# Patient Record
Sex: MALE | Race: OTHER | HISPANIC OR LATINO | URBAN - METROPOLITAN AREA
[De-identification: names, ages, dates, MRNs, and addresses within clinical notes are randomized per-mention and may not be internally consistent; named-entity substitution may affect disease eponyms.]

---

## 2017-01-18 ENCOUNTER — EMERGENCY (EMERGENCY)
Facility: HOSPITAL | Age: 57
LOS: 1 days | Discharge: HOME | End: 2017-01-18
Attending: EMERGENCY MEDICINE | Admitting: EMERGENCY MEDICINE
Payer: MEDICAID

## 2017-01-18 VITALS
TEMPERATURE: 98 F | RESPIRATION RATE: 18 BRPM | HEART RATE: 62 BPM | DIASTOLIC BLOOD PRESSURE: 82 MMHG | SYSTOLIC BLOOD PRESSURE: 129 MMHG | OXYGEN SATURATION: 96 %

## 2017-01-18 DIAGNOSIS — H92.02 OTALGIA, LEFT EAR: ICD-10-CM

## 2017-01-18 PROCEDURE — 99283 EMERGENCY DEPT VISIT LOW MDM: CPT

## 2017-01-18 RX ORDER — CIPROFLOXACIN AND DEXAMETHASONE 3; 1 MG/ML; MG/ML
3 SUSPENSION/ DROPS AURICULAR (OTIC)
Qty: 1 | Refills: 0 | OUTPATIENT
Start: 2017-01-18 | End: 2017-01-28

## 2017-01-18 RX ORDER — AMOXICILLIN 250 MG/5ML
1 SUSPENSION, RECONSTITUTED, ORAL (ML) ORAL
Qty: 20 | Refills: 0 | OUTPATIENT
Start: 2017-01-18 | End: 2017-01-28

## 2017-01-18 NOTE — ED PROVIDER NOTE - PLAN OF CARE
- Hay 2 antibioticos en tu farmacia  - Yaritza 1 pastilla 2 veces al zack por 10 lay  - [place] 3 gotas 2 veces al zack por 10 lay  - Puede hector Motrin 400-600mg cada 6 horas por dolor. Yaritza con comida.

## 2017-01-18 NOTE — ED PROVIDER NOTE - CARE PLAN
Principal Discharge DX:	Ear pain, left  Instructions for follow-up, activity and diet:	- Hay 2 antibioticos en tu farmacia  - Yaritza 1 pastilla 2 veces al zack por 10 lay  - Usa 3 gotas 2 veces al zack por 10 lay  - Puede hector Motrin 400-600mg cada 6 horas por dolor. Yaritza con comida.

## 2017-01-18 NOTE — ED PROVIDER NOTE - MEDICAL DECISION MAKING DETAILS
Left ear pain, back pain.  Improved after treatment for otitis externa, but likely also has otitis media.  No neuro defecits, fever/chills.  Otherwise well appearing.  will send new antibiotics to pharmacy and discharge home.    - Veronica Garrido DO Left ear pain, back pain.  Improved after treatment for otitis externa, but likely also has otitis media.  No neuro defecits, fever/chills.  Otherwise well appearing.  will send new antibiotics to pharmacy and discharge home.    - Veronica Garrido,   Attending Statement: Agree with the above.  Otitis externa c evidence of otitis media.  No evidence of mastoiditis, meningitis, or CSVT.  Oral abx.  D/C.  --JULIAN

## 2017-01-18 NOTE — ED PROVIDER NOTE - PHYSICAL EXAMINATION
Gen: NAD, AOx3  Head: NCAT  HEENT: PERRL, oral mucosa moist, no oropharyngeal exudates, normal conjunctiva.  Left external ear erythematous and edematous, no drainage.  Mild tenderness to palpation.  No pain with movement of pinna.  Left TM partially visible due to cerumen, but purulence noted on TM.      Lung: CTAB, no respiratory distress  CV: rrr, no murmurs, Normal perfusion  Neuro: No focal neurologic deficits   Psych: normal affect  - Veronica Garrido DO

## 2017-01-18 NOTE — ED PROVIDER NOTE - OBJECTIVE STATEMENT
53 y/o Irish speaking male with no pmh presents with ear pain.  Pt was seen on 12/25 for purulent and bloody drainage from left ear after popping a pimple 2 dys prior.  Pt was treated with cipro and polymyxin/neomycin drops for 10-14 days.  He no longer has drainage from the ear but it is still red and tender to palpation and he has new onset of diffuse, upper back pain.  No hearing loss, no fever/chills, no URI symptoms, no sick contacts.    - Veronica Garrido DO

## 2017-01-29 ENCOUNTER — EMERGENCY (EMERGENCY)
Facility: HOSPITAL | Age: 57
LOS: 1 days | Discharge: HOME | End: 2017-01-29
Attending: EMERGENCY MEDICINE | Admitting: EMERGENCY MEDICINE
Payer: SELF-PAY

## 2017-01-29 VITALS
HEIGHT: 60 IN | RESPIRATION RATE: 16 BRPM | OXYGEN SATURATION: 95 % | SYSTOLIC BLOOD PRESSURE: 122 MMHG | HEART RATE: 63 BPM | TEMPERATURE: 98 F | DIASTOLIC BLOOD PRESSURE: 79 MMHG

## 2017-01-29 DIAGNOSIS — H92.02 OTALGIA, LEFT EAR: ICD-10-CM

## 2017-01-29 DIAGNOSIS — H60.62 UNSPECIFIED CHRONIC OTITIS EXTERNA, LEFT EAR: ICD-10-CM

## 2017-01-29 DIAGNOSIS — Z88.6 ALLERGY STATUS TO ANALGESIC AGENT: ICD-10-CM

## 2017-01-29 PROCEDURE — 99284 EMERGENCY DEPT VISIT MOD MDM: CPT

## 2017-01-29 PROCEDURE — 99283 EMERGENCY DEPT VISIT LOW MDM: CPT

## 2017-01-29 RX ORDER — CIPROFLOXACIN LACTATE 400MG/40ML
1 VIAL (ML) INTRAVENOUS
Qty: 14 | Refills: 0 | OUTPATIENT
Start: 2017-01-29 | End: 2017-02-05

## 2017-01-29 NOTE — ED ADULT NURSE NOTE - OBJECTIVE STATEMENT
51 y/o  male speaks some English, c/o through , that has had months of lt ear pain treated for otitis externa on 3 occasions with different antibiotic regimes with unsuccessful healing. Lt ear red draining some fluid.

## 2017-01-29 NOTE — ED PROVIDER NOTE - ATTENDING CONTRIBUTION TO CARE
I saw pt 1 month ago for ext canal abscess that self-drained.  I put him on doxy and oflox drops.  he has been to the ER 2 more times, given cipro and amoxicillin.  he still has left ear pain and redness.    no fever, well appearing, mild canal erythema and ext ear redness.  no lymphadenopathy.  given this is his 4th ER visit for this, ENT consulted.  recommended po abx and topical abx.  cont ciprodex, start po cipro.  stressed importance of f/u at ENT clinic.  no signs of mastoiditis.

## 2017-01-29 NOTE — ED PROVIDER NOTE - PLAN OF CARE
1. return for worsening symptoms or anything concerning to you  2. take all home meds as prescribed  3. follow up with your pmd call to make an appointment  4. use ciprodex as directed  5. take cipro 500mg PO BID x 7 days as directed  6. follow up with the ENT clinic this week call  (at [facility])

## 2017-01-29 NOTE — ED PROVIDER NOTE - OBJECTIVE STATEMENT
52M no pmh presents with left ear pain. Pain started > 1 month ago. Seen at that time diagnosed with in internal auditory canal abscess given ofloxacin drops and oral doxycyline. pt completed a 10 day course but symptoms didn't get better. came back to ED received a course of amoxicillin for 10 days. Symptoms didn't get better. came back after another 10 day course received ciprodex and oral cipro and completed another 10 day course. Symptoms still present. no pain over mastoid. no fevers. no change in hearing. pain moderate constant non-radiating.

## 2017-01-29 NOTE — ED PROVIDER NOTE - MEDICAL DECISION MAKING DETAILS
52M presents with left ear pain redness and fibrous material. no hx of dm. no mastoid pain. symptoms consistent with recurrent OE. Will pain symptom control and have ENT consult.

## 2017-01-29 NOTE — ED PROVIDER NOTE - CARE PLAN
Principal Discharge DX:	Chronic otitis externa of left ear  Instructions for follow-up, activity and diet:	1. return for worsening symptoms or anything concerning to you  2. take all home meds as prescribed  3. follow up with your pmd call to make an appointment  4. use ciprodex as directed  5. take cipro 500mg PO BID x 7 days as directed  6. follow up with the ENT clinic this week call  (at [facility]) Principal Discharge DX:	Chronic otitis externa of left ear  Instructions for follow-up, activity and diet:	1. return for worsening symptoms or anything concerning to you  2. take all home meds as prescribed  3. follow up with your pmd call to make an appointment  4. use ciprodex as directed  5. take cipro 500mg PO BID x 7 days as directed  6. follow up with the ENT clinic this week call  (at [facility])

## 2017-01-29 NOTE — ED PROVIDER NOTE - PROGRESS NOTE DETAILS
ENT saw patient - said likely chronic OE - pt may ne myringotomy tube-  recommends another course of cipro and ciprodex (pt already has) and to follow up with ENT this week .

## 2017-02-16 ENCOUNTER — EMERGENCY (EMERGENCY)
Facility: HOSPITAL | Age: 57
LOS: 1 days | Discharge: HOME | End: 2017-02-16
Attending: EMERGENCY MEDICINE | Admitting: EMERGENCY MEDICINE
Payer: MEDICAID

## 2017-02-16 VITALS
SYSTOLIC BLOOD PRESSURE: 156 MMHG | HEART RATE: 61 BPM | OXYGEN SATURATION: 95 % | TEMPERATURE: 97 F | DIASTOLIC BLOOD PRESSURE: 88 MMHG | RESPIRATION RATE: 16 BRPM

## 2017-02-16 DIAGNOSIS — H92.02 OTALGIA, LEFT EAR: ICD-10-CM

## 2017-02-16 PROCEDURE — 99284 EMERGENCY DEPT VISIT MOD MDM: CPT

## 2017-02-16 RX ORDER — CIPROFLOXACIN LACTATE 400MG/40ML
1 VIAL (ML) INTRAVENOUS
Qty: 20 | Refills: 0 | OUTPATIENT
Start: 2017-02-16 | End: 2017-02-26

## 2017-02-16 RX ORDER — ACETAMINOPHEN 500 MG
650 TABLET ORAL ONCE
Qty: 0 | Refills: 0 | Status: COMPLETED | OUTPATIENT
Start: 2017-02-16 | End: 2017-02-16

## 2017-02-16 RX ADMIN — Medication 650 MILLIGRAM(S): at 21:08

## 2017-02-16 NOTE — ED PROVIDER NOTE - PLAN OF CARE
Take tylenol 650mg every 8 hours or motrin 600mg every 8 hours for pain as needed. Take with food. Take cipro 500mg every 12 hours for 10 days.   Follow up with ENT clinic #[phone] or Dr. Rivas [phone] in office next week. Call to make appointment.   Return to ER for worsening pain, fever, drainage from ear, or any other concerning symptoms. Apply warm compresses to left ear for comfort as needed.  Take tylenol 650mg every 8 hours or motrin 600mg every 8 hours for pain as needed. Take with food. Take cipro 500mg every 12 hours for 10 days.   Follow up with ENT clinic #[phone] or Dr. Rivas [phone] in office next week. Call to make appointment.   Return to ER for worsening pain, fever, drainage from ear, or any other concerning symptoms.

## 2017-02-16 NOTE — ED PROVIDER NOTE - OBJECTIVE STATEMENT
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  Pt using ciprodex drops with no relief, reports pain is 9/10, non-radiating. Associated fever. Denies HA, dizziness, ear drainage.

## 2017-02-16 NOTE — ED PROVIDER NOTE - MEDICAL DECISION MAKING DETAILS
52M PMH HLD presenting with L ear pain x 1.5 months. associated fever. On exam, +erythema and inflammation of EAC, + tragal ttp. ENT consult, analgesia, re-eval

## 2017-02-16 NOTE — ED ADULT NURSE NOTE - OBJECTIVE STATEMENT
52 year old male a/ox3 ambulatory presenting to ed with ear pain. patient evaluated tx and dc by md and pa. ambulated out of ed

## 2017-02-16 NOTE — ED PROVIDER NOTE - CARE PLAN
Instructions for follow-up, activity and diet:	Take tylenol 650mg every 8 hours or motrin 600mg every 8 hours for pain as needed. Take with food. Take cipro 500mg every 12 hours for 10 days.   Follow up with ENT clinic #[phone] or Dr. Rivas [phone] in office next week. Call to make appointment.   Return to ER for worsening pain, fever, drainage from ear, or any other concerning symptoms. Instructions for follow-up, activity and diet:	Apply warm compresses to left ear for comfort as needed.  Take tylenol 650mg every 8 hours or motrin 600mg every 8 hours for pain as needed. Take with food. Take cipro 500mg every 12 hours for 10 days.   Follow up with ENT clinic #[phone] or Dr. Rivas [phone] in office next week. Call to make appointment.   Return to ER for worsening pain, fever, drainage from ear, or any other concerning symptoms. Principal Discharge DX:	Perichondritis  Instructions for follow-up, activity and diet:	Apply warm compresses to left ear for comfort as needed.  Take tylenol 650mg every 8 hours or motrin 600mg every 8 hours for pain as needed. Take with food. Take cipro 500mg every 12 hours for 10 days.   Follow up with ENT clinic #[phone] or Dr. Rivas [phone] in office next week. Call to make appointment.   Return to ER for worsening pain, fever, drainage from ear, or any other concerning symptoms. Principal Discharge DX:	Perichondritis  Instructions for follow-up, activity and diet:	Apply warm compresses to left ear for comfort as needed.  Take tylenol 650mg every 8 hours or motrin 600mg every 8 hours for pain as needed. Take with food. Take cipro 500mg every 12 hours for 10 days.   Follow up with ENT clinic #[phone] or Dr. Rivas [phone] in office next week. Call to make appointment.   Return to ER for worsening pain, fever, drainage from ear, or any other concerning symptoms.

## 2017-02-27 ENCOUNTER — APPOINTMENT (OUTPATIENT)
Dept: OTOLARYNGOLOGY | Facility: CLINIC | Age: 57
End: 2017-02-27

## 2017-02-27 VITALS — WEIGHT: 170 LBS

## 2017-02-27 DIAGNOSIS — Z00.00 ENCOUNTER FOR GENERAL ADULT MEDICAL EXAMINATION W/OUT ABNORMAL FINDINGS: ICD-10-CM

## 2017-02-27 DIAGNOSIS — E78.00 PURE HYPERCHOLESTEROLEMIA, UNSPECIFIED: ICD-10-CM

## 2017-02-27 RX ORDER — SULFACETAMIDE SODIUM AND PREDNISOLONE SODIUM PHOSPHATE 100; 2.3 MG/ML; MG/ML
10-0.23 SOLUTION/ DROPS OPHTHALMIC
Qty: 1 | Refills: 2 | Status: COMPLETED | COMMUNITY
Start: 2017-02-27

## 2017-03-01 PROBLEM — E78.00 HIGH CHOLESTEROL: Status: RESOLVED | Noted: 2017-02-27 | Resolved: 2017-03-01

## 2017-03-01 RX ORDER — CIPROFLOXACIN HYDROCHLORIDE 500 MG/1
500 TABLET, FILM COATED ORAL
Refills: 0 | Status: COMPLETED | COMMUNITY

## 2017-03-01 RX ORDER — AMOXICILLIN 875 MG/1
875 TABLET, FILM COATED ORAL
Qty: 20 | Refills: 0 | Status: DISCONTINUED | COMMUNITY
Start: 2017-01-18

## 2017-03-01 RX ORDER — CIPROFLOXACIN HYDROCHLORIDE 750 MG/1
750 TABLET, FILM COATED ORAL
Qty: 20 | Refills: 0 | Status: COMPLETED | COMMUNITY
Start: 2017-01-01

## 2017-03-09 ENCOUNTER — OUTPATIENT (OUTPATIENT)
Dept: OUTPATIENT SERVICES | Facility: HOSPITAL | Age: 57
LOS: 1 days | End: 2017-03-09
Payer: SELF-PAY

## 2017-03-09 ENCOUNTER — RESULT CHARGE (OUTPATIENT)
Age: 57
End: 2017-03-09

## 2017-03-09 ENCOUNTER — APPOINTMENT (OUTPATIENT)
Dept: INTERNAL MEDICINE | Facility: CLINIC | Age: 57
End: 2017-03-09

## 2017-03-09 VITALS — HEART RATE: 69 BPM | WEIGHT: 169 LBS | DIASTOLIC BLOOD PRESSURE: 76 MMHG | SYSTOLIC BLOOD PRESSURE: 132 MMHG

## 2017-03-09 DIAGNOSIS — I10 ESSENTIAL (PRIMARY) HYPERTENSION: ICD-10-CM

## 2017-03-09 PROBLEM — Z00.00 ENCOUNTER FOR PREVENTIVE HEALTH EXAMINATION: Noted: 2017-02-03

## 2017-03-09 LAB
GLUCOSE BLDC GLUCOMTR-MCNC: 91
HBA1C MFR BLD HPLC: 5.5

## 2017-03-09 RX ORDER — SULFACETAMIDE SODIUM AND PREDNISOLONE SODIUM PHOSPHATE 100; 2.3 MG/ML; MG/ML
10-0.23 SOLUTION/ DROPS OPHTHALMIC
Qty: 1 | Refills: 1 | Status: DISCONTINUED | COMMUNITY
Start: 2017-02-28 | End: 2017-03-09

## 2017-03-09 RX ORDER — TRIAMCINOLONE ACETONIDE 1 MG/G
0.1 CREAM TOPICAL TWICE DAILY
Qty: 1 | Refills: 0 | Status: DISCONTINUED | COMMUNITY
Start: 2017-02-27 | End: 2017-03-09

## 2017-03-09 RX ORDER — CIPROFLOXACIN AND DEXAMETHASONE 3; 1 MG/ML; MG/ML
0.3-0.1 SUSPENSION/ DROPS AURICULAR (OTIC)
Qty: 8 | Refills: 0 | Status: DISCONTINUED | COMMUNITY
Start: 2017-01-18 | End: 2017-03-09

## 2017-03-10 DIAGNOSIS — H66.90 OTITIS MEDIA, UNSPECIFIED, UNSPECIFIED EAR: ICD-10-CM

## 2017-04-12 ENCOUNTER — APPOINTMENT (OUTPATIENT)
Dept: INTERNAL MEDICINE | Facility: CLINIC | Age: 57
End: 2017-04-12

## 2017-04-12 VITALS
BODY MASS INDEX: 27.64 KG/M2 | HEART RATE: 59 BPM | OXYGEN SATURATION: 98 % | HEIGHT: 66.5 IN | WEIGHT: 174 LBS | SYSTOLIC BLOOD PRESSURE: 110 MMHG | DIASTOLIC BLOOD PRESSURE: 80 MMHG

## 2017-04-12 RX ORDER — FLUCONAZOLE 150 MG/1
150 TABLET ORAL DAILY
Qty: 3 | Refills: 0 | Status: COMPLETED | COMMUNITY
Start: 2017-03-09 | End: 2017-04-12

## 2017-04-12 RX ORDER — CIPROFLOXACIN 0.5 MG/.25ML
0.2 SOLUTION/ DROPS AURICULAR (OTIC) TWICE DAILY
Qty: 1 | Refills: 0 | Status: COMPLETED | COMMUNITY
Start: 2017-03-09 | End: 2017-04-12

## 2017-04-13 ENCOUNTER — OUTPATIENT (OUTPATIENT)
Dept: OUTPATIENT SERVICES | Facility: HOSPITAL | Age: 57
LOS: 1 days | End: 2017-04-13
Payer: SELF-PAY

## 2017-04-13 DIAGNOSIS — I10 ESSENTIAL (PRIMARY) HYPERTENSION: ICD-10-CM

## 2017-04-13 DIAGNOSIS — H66.90 OTITIS MEDIA, UNSPECIFIED, UNSPECIFIED EAR: ICD-10-CM

## 2017-04-19 DIAGNOSIS — H60.542: ICD-10-CM

## 2017-05-03 NOTE — ED ADULT NURSE NOTE - PSH
No significant past surgical history gait, locomotion, and balance/aerobic capacity/endurance/muscle strength

## 2017-05-10 ENCOUNTER — APPOINTMENT (OUTPATIENT)
Dept: INTERNAL MEDICINE | Facility: CLINIC | Age: 57
End: 2017-05-10

## 2017-05-10 VITALS
SYSTOLIC BLOOD PRESSURE: 110 MMHG | BODY MASS INDEX: 27.66 KG/M2 | WEIGHT: 174 LBS | RESPIRATION RATE: 14 BRPM | HEART RATE: 58 BPM | TEMPERATURE: 98.2 F | DIASTOLIC BLOOD PRESSURE: 80 MMHG

## 2017-05-10 RX ORDER — AMOXICILLIN AND CLAVULANATE POTASSIUM 875; 125 MG/1; MG/1
875-125 TABLET, COATED ORAL
Qty: 20 | Refills: 0 | Status: COMPLETED | COMMUNITY
Start: 2017-03-27

## 2017-06-06 ENCOUNTER — EMERGENCY (EMERGENCY)
Facility: HOSPITAL | Age: 57
LOS: 1 days | Discharge: HOME | End: 2017-06-06
Attending: EMERGENCY MEDICINE | Admitting: EMERGENCY MEDICINE
Payer: MEDICAID

## 2017-06-06 VITALS
OXYGEN SATURATION: 97 % | SYSTOLIC BLOOD PRESSURE: 120 MMHG | RESPIRATION RATE: 18 BRPM | HEART RATE: 74 BPM | DIASTOLIC BLOOD PRESSURE: 76 MMHG

## 2017-06-06 VITALS
DIASTOLIC BLOOD PRESSURE: 82 MMHG | RESPIRATION RATE: 18 BRPM | TEMPERATURE: 98 F | HEART RATE: 69 BPM | OXYGEN SATURATION: 96 % | SYSTOLIC BLOOD PRESSURE: 116 MMHG

## 2017-06-06 DIAGNOSIS — Z88.6 ALLERGY STATUS TO ANALGESIC AGENT: ICD-10-CM

## 2017-06-06 DIAGNOSIS — K64.4 RESIDUAL HEMORRHOIDAL SKIN TAGS: ICD-10-CM

## 2017-06-06 DIAGNOSIS — K62.89 OTHER SPECIFIED DISEASES OF ANUS AND RECTUM: ICD-10-CM

## 2017-06-06 DIAGNOSIS — E78.5 HYPERLIPIDEMIA, UNSPECIFIED: ICD-10-CM

## 2017-06-06 PROCEDURE — 99283 EMERGENCY DEPT VISIT LOW MDM: CPT

## 2017-06-06 RX ORDER — HYDROCORTISONE 1 %
1 OINTMENT (GRAM) TOPICAL
Qty: 14 | Refills: 0 | OUTPATIENT
Start: 2017-06-06 | End: 2017-06-20

## 2017-06-06 RX ORDER — SENNA PLUS 8.6 MG/1
1 TABLET ORAL
Qty: 14 | Refills: 0 | OUTPATIENT
Start: 2017-06-06

## 2017-06-06 RX ORDER — DOCUSATE SODIUM 100 MG
1 CAPSULE ORAL
Qty: 30 | Refills: 0 | OUTPATIENT
Start: 2017-06-06

## 2017-06-06 NOTE — ED PROVIDER NOTE - ATTENDING CONTRIBUTION TO CARE
I have examined and evaluated this patient with the above resident or PA, and agree with the documented clinical history, exam and plan.   Briefly: 53M c/o rectal pain and lump; enlarged hemorrhoid noted, not thrombosed; will dc on miralax, instructions to use ring shaped cushion, f/u with pmd.

## 2017-06-06 NOTE — ED PROVIDER NOTE - OBJECTIVE STATEMENT
52yo M with h/o HLD, c/o rectal pain and lump.  Patient states he has been having intermittent rectal pain a/w defecation for past 2 days, today pain became more constant and reports today having noticed a lump or "grain" located protruding from his anus.  Recently had some constipation a/w antibiotics used for ear infection (few months ago), but constipation is now resolved; states he has not had to strain or push hard to have bowel movement.  No bleeding.  No history of hemorrhoids or rectal/anal surgery.

## 2017-06-06 NOTE — ED ADULT NURSE NOTE - OBJECTIVE STATEMENT
54 y/o M presents to the ED c/o rectal pain.  Patient has PMH HLD.  Patient is Kazakh speaking,  phone used.  Patient states he has been having intermittent rectal pain  when trying to have a BM for the last 2-3 days.  Pt states that today pain became more constant and reports today having noticed a lump or "grain" located protruding from his anus.  Pt also mentions that he recently was on antibiotics for an ear infection and states the antx. made him constipated, but that has now resolved.  Pt states he has not had to strain or push hard to have bowel movement.  No bleeding when making a BM or wiping.  Pt denies any hx of hemorrhoids.  Patient is A&Ox4. Face is symmetrical. PERRL 3mmB. Speech is clear.  Pt safety and comfort measures provided.

## 2017-06-06 NOTE — ED PROVIDER NOTE - MEDICAL DECISION MAKING DETAILS
53M c/o rectal pain and lump; enlarged hemorrhoid noted, not thrombosed; will dc on miralax, instructions to use ring shaped cushion, f/u with pmd.

## 2017-06-21 ENCOUNTER — OUTPATIENT (OUTPATIENT)
Dept: OUTPATIENT SERVICES | Facility: HOSPITAL | Age: 57
LOS: 1 days | End: 2017-06-21
Payer: SELF-PAY

## 2017-06-21 ENCOUNTER — APPOINTMENT (OUTPATIENT)
Dept: INTERNAL MEDICINE | Facility: CLINIC | Age: 57
End: 2017-06-21

## 2017-06-21 VITALS
BODY MASS INDEX: 26.68 KG/M2 | SYSTOLIC BLOOD PRESSURE: 106 MMHG | DIASTOLIC BLOOD PRESSURE: 80 MMHG | HEIGHT: 66 IN | WEIGHT: 166 LBS

## 2017-06-21 DIAGNOSIS — I10 ESSENTIAL (PRIMARY) HYPERTENSION: ICD-10-CM

## 2017-06-22 RX ORDER — CIPROFLOXACIN AND DEXAMETHASONE 3; 1 MG/ML; MG/ML
0.3-0.1 SUSPENSION/ DROPS AURICULAR (OTIC)
Qty: 1 | Refills: 0 | Status: DISCONTINUED | COMMUNITY
Start: 2017-05-10 | End: 2017-06-22

## 2017-06-23 DIAGNOSIS — M54.9 DORSALGIA, UNSPECIFIED: ICD-10-CM

## 2017-06-23 DIAGNOSIS — K62.89 OTHER SPECIFIED DISEASES OF ANUS AND RECTUM: ICD-10-CM

## 2017-06-23 DIAGNOSIS — H66.90 OTITIS MEDIA, UNSPECIFIED, UNSPECIFIED EAR: ICD-10-CM

## 2017-07-10 ENCOUNTER — OUTPATIENT (OUTPATIENT)
Dept: OUTPATIENT SERVICES | Facility: HOSPITAL | Age: 57
LOS: 1 days | Discharge: HOME | End: 2017-07-10

## 2017-07-10 ENCOUNTER — APPOINTMENT (OUTPATIENT)
Dept: OTOLARYNGOLOGY | Facility: CLINIC | Age: 57
End: 2017-07-10

## 2017-07-10 DIAGNOSIS — H60.542 ACUTE ECZEMATOID OTITIS EXTERNA, LEFT EAR: ICD-10-CM

## 2017-07-14 DIAGNOSIS — H60.542: ICD-10-CM

## 2017-12-04 ENCOUNTER — EMERGENCY (EMERGENCY)
Facility: HOSPITAL | Age: 57
LOS: 1 days | Discharge: HOME | End: 2017-12-04
Attending: EMERGENCY MEDICINE | Admitting: EMERGENCY MEDICINE
Payer: MEDICAID

## 2017-12-04 VITALS
DIASTOLIC BLOOD PRESSURE: 86 MMHG | HEART RATE: 72 BPM | OXYGEN SATURATION: 97 % | RESPIRATION RATE: 18 BRPM | SYSTOLIC BLOOD PRESSURE: 124 MMHG | TEMPERATURE: 98 F

## 2017-12-04 PROCEDURE — 99283 EMERGENCY DEPT VISIT LOW MDM: CPT

## 2017-12-04 RX ORDER — IBUPROFEN 200 MG
600 TABLET ORAL ONCE
Qty: 0 | Refills: 0 | Status: COMPLETED | OUTPATIENT
Start: 2017-12-04 | End: 2017-12-04

## 2017-12-04 RX ADMIN — Medication 600 MILLIGRAM(S): at 19:33

## 2017-12-04 RX ADMIN — Medication 1 TABLET(S): at 19:32

## 2017-12-04 NOTE — ED ADULT NURSE NOTE - OBJECTIVE STATEMENT
53 y.o. Male presents to the ED c/o L ear pain x4 days. Pt is Nauruan speaking only with friend at bedside to translate. Pt reports using mometasone cream on L ear. Pt states feeling "something blocking" his hearing. Redness noted on L ear. Ear wax noted in L ear. Pt states similar episode happening last year and followed up with ENT MD. RAYMOND. Denies vision changes. Pt is in no current distress. Comfort and safety provided. Will continue to monitor.

## 2017-12-04 NOTE — ED ADULT NURSE NOTE - CHPI ED SYMPTOMS NEG
no vomiting/no blurred vision/no change in level of consciousness/no chills/no syncope/no loss of consciousness/no nausea/no fever/no numbness

## 2017-12-04 NOTE — ED PROVIDER NOTE - MEDICAL DECISION MAKING DETAILS
Preston: Patient with left ear pain. wax removed, and tm dull and + fluid behind tm. will start on oral abx, pain control, refer to outpatient f/u.

## 2017-12-04 NOTE — ED PROVIDER NOTE - CARE PLAN
Principal Discharge DX:	Other recurrent acute nonsuppurative otitis media of left ear  Instructions for follow-up, activity and diet:	Take motrin 600mg every 8 hours as needed for pain.   Stay well hydrated.   Take augmentin twice daily for seven days.   Follow up with your PCP and/or ENT clinic for further evaluation in 2-3 days.   Return to ER for worsening pain, fever, vomiting, inability to take antibiotic, or any other concerning symptoms.

## 2017-12-04 NOTE — ED PROVIDER NOTE - PLAN OF CARE
Take motrin 600mg every 8 hours as needed for pain.   Stay well hydrated.   Take augmentin twice daily for seven days.   Follow up with your PCP and/or ENT clinic for further evaluation in 2-3 days.   Return to ER for worsening pain, fever, vomiting, inability to take antibiotic, or any other concerning symptoms.

## 2017-12-04 NOTE — ED PROVIDER NOTE - OBJECTIVE STATEMENT
54yo M with PMH HLD presenting with L ear pain x 4 days. Reports associated sensation of fluid in ear and decreased hearing. Reports he has been applying steroid ointment to ear with no relief. Reports similar ear pain last year. Denies fever/chills, drainage from ear, R ear pain, n/v, recent travel, recent illness, recent swimming. Denies smoking, alcohol use, drug use.

## 2017-12-18 ENCOUNTER — EMERGENCY (EMERGENCY)
Facility: HOSPITAL | Age: 57
LOS: 1 days | Discharge: HOME | End: 2017-12-18
Attending: EMERGENCY MEDICINE
Payer: MEDICAID

## 2017-12-18 VITALS
SYSTOLIC BLOOD PRESSURE: 144 MMHG | TEMPERATURE: 99 F | RESPIRATION RATE: 16 BRPM | OXYGEN SATURATION: 96 % | WEIGHT: 160.06 LBS | HEART RATE: 69 BPM | DIASTOLIC BLOOD PRESSURE: 82 MMHG

## 2017-12-18 PROCEDURE — 99282 EMERGENCY DEPT VISIT SF MDM: CPT

## 2017-12-18 NOTE — ED PROVIDER NOTE - ATTENDING CONTRIBUTION TO CARE
Attending MD Yin:   I personally have seen and examined this patient.  Physician assistant note reviewed and agree on plan of care and except where noted.  See below for details.    53M with PMH including HLD presents to the ED for recheck of ear.  Reports was seen on 12/4 for ear pain, given Augmentin, completed the course and is now here for persistent itching to external L ear.  Patient reports that the pain for which he initially came to the ED resolved during Augmentin course and has not returned.  Reports that he has been applying a cream, not prescribed, to the external ear.  Reports he has itching to the external ear.  Reports he also notices pain at L ear with yawning.  Denies fevers, chills. Denies change in hearing.  Denies purulence or bleeding from ear. Denies change in vision, double vision, sudden loss of vision.  Denies headache, neck pain.  Denies epistaxis.  On exam,no conjunctival injection, no rash on face, no dried blood at nares, bilateral TMs clear, no evidence of otitis media or externa bilaterally, +L ear with thick cream/ointment on ear, removed and cleaned from under helix with cotton tip applicators as thick and brownish, minimal erythema, no fluctuance; A/P: 53M with resolved otitis media, instructed to discontinue use of topical cream, follow up with PMD.  Follow up instructions given, importance of follow up emphasized, return to ED parameters reviewed and patient verbalized understanding.  All questions answered, all concerns addressed.

## 2017-12-18 NOTE — ED PROVIDER NOTE - PLAN OF CARE
Stop use of cream to the external ear  Follow up with your Primary Care Physician within the next 2-3 days  Continue your current medication regimen  Return to the Emergency Room if you experience new or worsening symptoms

## 2017-12-18 NOTE — ED PROVIDER NOTE - CARE PLAN
Principal Discharge DX:	Ear pain, left  Instructions for follow-up, activity and diet:	Stop use of cream to the external ear  Follow up with your Primary Care Physician within the next 2-3 days  Continue your current medication regimen  Return to the Emergency Room if you experience new or worsening symptoms

## 2017-12-18 NOTE — ED PROVIDER NOTE - OBJECTIVE STATEMENT
53 year old male w HLD treated in ED on December 4th 2017 for ottitis media with Augmentin which he completed his course. He reports that he has been applying a cream to the left external ear and has itching inside of the ear that persists. He also notices some pain in the left ear when he yawns.

## 2018-01-20 ENCOUNTER — MOBILE ON CALL (OUTPATIENT)
Age: 58
End: 2018-01-20

## 2018-01-21 ENCOUNTER — MOBILE ON CALL (OUTPATIENT)
Age: 58
End: 2018-01-21

## 2018-01-22 ENCOUNTER — APPOINTMENT (OUTPATIENT)
Dept: INTERNAL MEDICINE | Facility: CLINIC | Age: 58
End: 2018-01-22

## 2018-01-22 ENCOUNTER — OUTPATIENT (OUTPATIENT)
Dept: OUTPATIENT SERVICES | Facility: HOSPITAL | Age: 58
LOS: 1 days | End: 2018-01-22
Payer: SELF-PAY

## 2018-01-22 VITALS
DIASTOLIC BLOOD PRESSURE: 80 MMHG | WEIGHT: 169 LBS | BODY MASS INDEX: 27.16 KG/M2 | HEIGHT: 66 IN | SYSTOLIC BLOOD PRESSURE: 120 MMHG

## 2018-01-22 DIAGNOSIS — L82.1 OTHER SEBORRHEIC KERATOSIS: ICD-10-CM

## 2018-01-22 DIAGNOSIS — I10 ESSENTIAL (PRIMARY) HYPERTENSION: ICD-10-CM

## 2018-01-22 DIAGNOSIS — M54.9 DORSALGIA, UNSPECIFIED: ICD-10-CM

## 2018-01-22 DIAGNOSIS — K62.89 OTHER SPECIFIED DISEASES OF ANUS AND RECTUM: ICD-10-CM

## 2018-01-22 PROCEDURE — G0463: CPT

## 2018-01-25 DIAGNOSIS — R09.82 POSTNASAL DRIP: ICD-10-CM

## 2018-01-25 DIAGNOSIS — L82.1 OTHER SEBORRHEIC KERATOSIS: ICD-10-CM

## 2018-02-12 ENCOUNTER — EMERGENCY (EMERGENCY)
Facility: HOSPITAL | Age: 58
LOS: 1 days | Discharge: HOME | End: 2018-02-12
Attending: EMERGENCY MEDICINE | Admitting: EMERGENCY MEDICINE
Payer: MEDICAID

## 2018-02-12 VITALS
SYSTOLIC BLOOD PRESSURE: 139 MMHG | HEART RATE: 94 BPM | TEMPERATURE: 99 F | DIASTOLIC BLOOD PRESSURE: 100 MMHG | OXYGEN SATURATION: 99 % | RESPIRATION RATE: 16 BRPM

## 2018-02-12 PROCEDURE — 99284 EMERGENCY DEPT VISIT MOD MDM: CPT

## 2018-02-12 NOTE — ED ADULT NURSE NOTE - OBJECTIVE STATEMENT
Patient presented to ED ambulatory c/o abdominal distention and mild abdominal pain x 3 months, symptoms gradually getting worse. + bs, last bm yesterday.

## 2018-02-13 VITALS
TEMPERATURE: 98 F | RESPIRATION RATE: 17 BRPM | HEART RATE: 66 BPM | SYSTOLIC BLOOD PRESSURE: 117 MMHG | DIASTOLIC BLOOD PRESSURE: 79 MMHG | OXYGEN SATURATION: 97 %

## 2018-02-13 LAB
ALBUMIN SERPL ELPH-MCNC: 4.2 G/DL — SIGNIFICANT CHANGE UP (ref 3.3–5)
ALP SERPL-CCNC: 70 U/L — SIGNIFICANT CHANGE UP (ref 40–120)
ALT FLD-CCNC: 24 U/L RC — SIGNIFICANT CHANGE UP (ref 10–45)
ANION GAP SERPL CALC-SCNC: 15 MMOL/L — SIGNIFICANT CHANGE UP (ref 5–17)
AST SERPL-CCNC: 19 U/L — SIGNIFICANT CHANGE UP (ref 10–40)
BASOPHILS # BLD AUTO: 0.1 K/UL — SIGNIFICANT CHANGE UP (ref 0–0.2)
BASOPHILS NFR BLD AUTO: 1 % — SIGNIFICANT CHANGE UP (ref 0–2)
BILIRUB SERPL-MCNC: 0.7 MG/DL — SIGNIFICANT CHANGE UP (ref 0.2–1.2)
BUN SERPL-MCNC: 18 MG/DL — SIGNIFICANT CHANGE UP (ref 7–23)
CALCIUM SERPL-MCNC: 9.3 MG/DL — SIGNIFICANT CHANGE UP (ref 8.4–10.5)
CHLORIDE SERPL-SCNC: 105 MMOL/L — SIGNIFICANT CHANGE UP (ref 96–108)
CO2 SERPL-SCNC: 22 MMOL/L — SIGNIFICANT CHANGE UP (ref 22–31)
CREAT SERPL-MCNC: 0.77 MG/DL — SIGNIFICANT CHANGE UP (ref 0.5–1.3)
EOSINOPHIL # BLD AUTO: 0.2 K/UL — SIGNIFICANT CHANGE UP (ref 0–0.5)
EOSINOPHIL NFR BLD AUTO: 2.4 % — SIGNIFICANT CHANGE UP (ref 0–6)
GAS PNL BLDV: SIGNIFICANT CHANGE UP
GLUCOSE SERPL-MCNC: 93 MG/DL — SIGNIFICANT CHANGE UP (ref 70–99)
HCT VFR BLD CALC: 45.6 % — SIGNIFICANT CHANGE UP (ref 39–50)
HGB BLD-MCNC: 15.4 G/DL — SIGNIFICANT CHANGE UP (ref 13–17)
LIDOCAIN IGE QN: 44 U/L — SIGNIFICANT CHANGE UP (ref 7–60)
LYMPHOCYTES # BLD AUTO: 2.5 K/UL — SIGNIFICANT CHANGE UP (ref 1–3.3)
LYMPHOCYTES # BLD AUTO: 39.9 % — SIGNIFICANT CHANGE UP (ref 13–44)
MCHC RBC-ENTMCNC: 29.6 PG — SIGNIFICANT CHANGE UP (ref 27–34)
MCHC RBC-ENTMCNC: 33.9 GM/DL — SIGNIFICANT CHANGE UP (ref 32–36)
MCV RBC AUTO: 87.2 FL — SIGNIFICANT CHANGE UP (ref 80–100)
MONOCYTES # BLD AUTO: 0.4 K/UL — SIGNIFICANT CHANGE UP (ref 0–0.9)
MONOCYTES NFR BLD AUTO: 7 % — SIGNIFICANT CHANGE UP (ref 2–14)
NEUTROPHILS # BLD AUTO: 3.2 K/UL — SIGNIFICANT CHANGE UP (ref 1.8–7.4)
NEUTROPHILS NFR BLD AUTO: 49.6 % — SIGNIFICANT CHANGE UP (ref 43–77)
PLATELET # BLD AUTO: 159 K/UL — SIGNIFICANT CHANGE UP (ref 150–400)
POTASSIUM SERPL-MCNC: 3.9 MMOL/L — SIGNIFICANT CHANGE UP (ref 3.5–5.3)
POTASSIUM SERPL-SCNC: 3.9 MMOL/L — SIGNIFICANT CHANGE UP (ref 3.5–5.3)
PROT SERPL-MCNC: 7.3 G/DL — SIGNIFICANT CHANGE UP (ref 6–8.3)
RBC # BLD: 5.22 M/UL — SIGNIFICANT CHANGE UP (ref 4.2–5.8)
RBC # FLD: 12 % — SIGNIFICANT CHANGE UP (ref 10.3–14.5)
SODIUM SERPL-SCNC: 142 MMOL/L — SIGNIFICANT CHANGE UP (ref 135–145)
WBC # BLD: 6.4 K/UL — SIGNIFICANT CHANGE UP (ref 3.8–10.5)
WBC # FLD AUTO: 6.4 K/UL — SIGNIFICANT CHANGE UP (ref 3.8–10.5)

## 2018-02-13 PROCEDURE — 74177 CT ABD & PELVIS W/CONTRAST: CPT | Mod: 26

## 2018-02-13 RX ORDER — FAMOTIDINE 10 MG/ML
1 INJECTION INTRAVENOUS
Qty: 28 | Refills: 0
Start: 2018-02-13 | End: 2018-02-26

## 2018-02-13 NOTE — ED PROVIDER NOTE - PROGRESS NOTE DETAILS
Attending MD Yin: Patient re-evaluated and no acute issues at  this time.  Lab and radiology tests reviewed with patient.  Diet modifications discussed with patient.  Patient stable for discharge. Follow up instructions given, importance of follow up emphasized, return to ED parameters reviewed and patient verbalized understanding.  All questions answered, all concerns addressed.

## 2018-02-13 NOTE — ED PROVIDER NOTE - CARE PLAN
Principal Discharge DX:	Gastritis, presence of bleeding unspecified, unspecified chronicity, unspecified gastritis type

## 2018-02-13 NOTE — ED PROVIDER NOTE - OBJECTIVE STATEMENT
Attending MD Yin: 57M with PMH HLD PSH L knee sx presents to the ED with abdominal pain and bloating for 3 months.  REports increased over the last 8 days.  Reports Tuesday developed associated dizziness, nausea, vomiting.  Denies fevers, chills.  Denies diarrhea, blood in stools.  Denies dysuria, hematuria, change in urinary habits including frequency, urgency. Reports abdominal pain is diffuse and not associated with po intake, movement, no alleviating factors.  Denies meds, reports allergy to Tylenol (rash), reports quit EtOH 18 yrs ago, quit tobacco 20 yrs ago, denies drugs.  Denies STI, sexual activity. On exam, head NCAT, PERRL, FROM at neck, no tenderness to palpation or stepoffs along length of spine, lungs CTAB with good inspiratory effort, +S1S2, no m/r/g, abdomen soft with +BS, +diffuse abdominal tenderness, ND, R CVAT, moving all extremities with 5/5 strength bilateral upper and lower extremities, good and equal  strength bilaterally; A/P: 57M with diffuse abdominal tenderness acute on chronic, Ddx gastritis, colitis, diverticulitis, lower suspicion for nephrolithiasis, biliary colic, will obtain labs, tolerating po so po hydration, declines Tylenol bc of allergy, declines narcotics, CT A/P, GI cocktail, reassess

## 2018-05-24 ENCOUNTER — EMERGENCY (EMERGENCY)
Facility: HOSPITAL | Age: 58
LOS: 1 days | Discharge: HOME | End: 2018-05-24
Attending: EMERGENCY MEDICINE
Payer: MEDICAID

## 2018-05-24 VITALS
OXYGEN SATURATION: 96 % | WEIGHT: 164.91 LBS | HEART RATE: 76 BPM | SYSTOLIC BLOOD PRESSURE: 118 MMHG | RESPIRATION RATE: 16 BRPM | DIASTOLIC BLOOD PRESSURE: 77 MMHG | TEMPERATURE: 98 F

## 2018-05-24 PROCEDURE — 99283 EMERGENCY DEPT VISIT LOW MDM: CPT | Mod: 25

## 2018-05-24 PROCEDURE — 69210 REMOVE IMPACTED EAR WAX UNI: CPT

## 2018-05-24 RX ORDER — CARBAMIDE PEROXIDE 81.86 MG/ML
5 SOLUTION/ DROPS AURICULAR (OTIC)
Qty: 1 | Refills: 0
Start: 2018-05-24 | End: 2018-05-27

## 2018-05-24 RX ORDER — ERYTHROMYCIN BASE 5 MG/GRAM
4 OINTMENT (GRAM) OPHTHALMIC (EYE)
Qty: 1 | Refills: 0
Start: 2018-05-24 | End: 2018-05-30

## 2018-05-24 NOTE — ED PROVIDER NOTE - MEDICAL DECISION MAKING DETAILS
AIDE Salas MD: 57 y/o male with PMH HTN p/w 3 days of L eyelid itching, redness and swelling and pruritis to L inner ear. States no changes to visual acuity, no eye pain. +crusting to eyelids. Denies new emmolients, soaps, shampoos, environmental exposures. Denies changes to hearing or pain to ears. No f/c. Exam c/w L eyelid blepharitis and L ear cerumen impaction. Plan: cerumen removal to L ear and provide debrox drops, erythromycin ointment to L eyelid and warm compresses, outpt f/u with ENT and Opthalmology

## 2018-05-24 NOTE — ED PROVIDER NOTE - PLAN OF CARE
1. Warm compresses to left eye 3 times a day. Use erythromycin eye drops as directed. Follow up with opthalmology clinic within 24-48 hours.   2. Use drop , 5 drops twice a day into the affected ear once a day. Follow up with ENT specialist. Information provided above.   3. Return to ED if worsening vision, discharge, increased pain or any other concerning symptoms.

## 2018-05-24 NOTE — ED ADULT NURSE NOTE - OBJECTIVE STATEMENT
58 year old male presents to ED ambulatory through waiting room complaining of left eyelid pain and itchiness with associated decreased vision for 3 days. Patient reports left ear is warm and itchy as well. PMH of ear infection 3 months ago and seasonal allergies. Patient denies new soap use. Patient denies pain in eyeball or recent illness. Patient is awake, alert, a&ox3, following commands. On assessment, eyelid is red and swollen. External ear is red and tender to palpation.

## 2018-05-24 NOTE — ED PROVIDER NOTE - CARE PLAN
Principal Discharge DX:	Blepharitis of eyelid of left eye  Assessment and plan of treatment:	1. Warm compresses to left eye 3 times a day. Use erythromycin eye drops as directed. Follow up with opthalmology clinic within 24-48 hours.   2. Use drop , 5 drops twice a day into the affected ear once a day. Follow up with ENT specialist. Information provided above.   3. Return to ED if worsening vision, discharge, increased pain or any other concerning symptoms.  Secondary Diagnosis:	Cerumen impaction

## 2018-05-24 NOTE — ED PROVIDER NOTE - OBJECTIVE STATEMENT
57 YO Angolan speaking, [employer] #[id number] Ashleigh, male PMhx HLD, seasonal allergies presents to ED c/o left upper lid swelling x 3 days. pt notes left eye itchiness and tearing, with blurry vision.  Does not wear corrective lenses. Pt states pain and tenderness to palpation of left upper lid. Pt has not used any eye drops, new lotions on face. Has not tried warm compresses. Pt also notes itchy and warm left ear with no decrease in hearing in left ear. Denies flashes of light, floaters in vision, loss of vision. Denies discharge from left eye. Denies pain of left eye or pain in ear.

## 2018-05-24 NOTE — ED PROVIDER NOTE - EYE, LEFT
clear/pupils equal, round, and reactive to light/LACRIMAL DRAINAGE/TENDERNESS/SWELLING/left upper lid erythema, swelling, TTP of lid,  discharge noted on carnuncle,

## 2018-08-29 PROBLEM — E78.5 HYPERLIPIDEMIA, UNSPECIFIED: Chronic | Status: ACTIVE | Noted: 2017-06-06

## 2018-09-04 ENCOUNTER — EMERGENCY (EMERGENCY)
Facility: HOSPITAL | Age: 58
LOS: 1 days | Discharge: HOME | End: 2018-09-04
Attending: EMERGENCY MEDICINE | Admitting: EMERGENCY MEDICINE
Payer: MEDICAID

## 2018-09-04 VITALS
TEMPERATURE: 99 F | OXYGEN SATURATION: 100 % | DIASTOLIC BLOOD PRESSURE: 75 MMHG | WEIGHT: 164.91 LBS | RESPIRATION RATE: 17 BRPM | SYSTOLIC BLOOD PRESSURE: 116 MMHG | HEART RATE: 90 BPM

## 2018-09-04 PROCEDURE — 99283 EMERGENCY DEPT VISIT LOW MDM: CPT

## 2018-09-04 RX ORDER — IBUPROFEN 200 MG
600 TABLET ORAL ONCE
Refills: 0 | Status: COMPLETED | OUTPATIENT
Start: 2018-09-04 | End: 2018-09-04

## 2018-09-04 RX ORDER — LIDOCAINE 4 G/100G
1 CREAM TOPICAL ONCE
Refills: 0 | Status: COMPLETED | OUTPATIENT
Start: 2018-09-04 | End: 2018-09-04

## 2018-09-04 RX ADMIN — Medication 600 MILLIGRAM(S): at 11:56

## 2018-09-04 RX ADMIN — LIDOCAINE 1 PATCH: 4 CREAM TOPICAL at 11:57

## 2018-09-04 NOTE — ED ADULT NURSE NOTE - NSIMPLEMENTINTERV_GEN_ALL_ED
Implemented All Universal Safety Interventions:  [place] to call system. Call bell, personal items and telephone within reach. Instruct patient to call for assistance. Room bathroom lighting operational. Non-slip footwear when patient is off stretcher. Physically safe environment: no spills, clutter or unnecessary equipment. Stretcher in lowest position, wheels locked, appropriate side rails in place.

## 2018-09-04 NOTE — ED PROVIDER NOTE - OBJECTIVE STATEMENT
58M p/w lower back pain for 4 hours. He was lifting a pot at work when he experienced sudden onset of non-radiating lower back pain. No weakenss/numbness/saddle anesthesia/loss of bowel or bladder control. No hx malignancy or recent steroid use. He also c/o R heel pain for 3 months, not acutely worse today.

## 2018-09-04 NOTE — ED PROVIDER NOTE - CHIEF COMPLAINT
The patient is a 58y Male complaining of foot and back pain The patient is a 58y Male complaining of back pain

## 2018-09-04 NOTE — ED PROVIDER NOTE - ATTENDING CONTRIBUTION TO CARE
58M p/w lower back pain today after lifting a pot. ambulating, nvi, paraspinal tend, analgesia and d.c home. r heel pain for three months to follow up with his orthopedist. likely plantar fascitis.  used.

## 2018-09-04 NOTE — ED ADULT NURSE NOTE - OBJECTIVE STATEMENT
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 59 y/o m to er alone w/c/o back pain x1m s/p lifting. is ambulatory.

## 2018-09-12 ENCOUNTER — EMERGENCY (EMERGENCY)
Facility: HOSPITAL | Age: 58
LOS: 1 days | Discharge: HOME | End: 2018-09-12
Attending: EMERGENCY MEDICINE
Payer: MEDICAID

## 2018-09-12 ENCOUNTER — APPOINTMENT (OUTPATIENT)
Dept: ORTHOPEDIC SURGERY | Facility: HOSPITAL | Age: 58
End: 2018-09-12

## 2018-09-12 VITALS
SYSTOLIC BLOOD PRESSURE: 142 MMHG | DIASTOLIC BLOOD PRESSURE: 81 MMHG | HEART RATE: 73 BPM | RESPIRATION RATE: 18 BRPM | OXYGEN SATURATION: 97 % | TEMPERATURE: 98 F

## 2018-09-12 PROCEDURE — 99283 EMERGENCY DEPT VISIT LOW MDM: CPT

## 2018-09-12 RX ORDER — IBUPROFEN 200 MG
600 TABLET ORAL ONCE
Qty: 0 | Refills: 0 | Status: COMPLETED | OUTPATIENT
Start: 2018-09-12 | End: 2018-09-12

## 2018-09-12 RX ORDER — LIDOCAINE 4 G/100G
1 CREAM TOPICAL ONCE
Qty: 0 | Refills: 0 | Status: COMPLETED | OUTPATIENT
Start: 2018-09-12 | End: 2018-09-12

## 2018-09-12 RX ORDER — LIDOCAINE 4 G/100G
1 CREAM TOPICAL
Qty: 15 | Refills: 0 | OUTPATIENT
Start: 2018-09-12 | End: 2018-09-26

## 2018-09-12 RX ADMIN — LIDOCAINE 1 PATCH: 4 CREAM TOPICAL at 11:28

## 2018-09-12 RX ADMIN — Medication 600 MILLIGRAM(S): at 11:28

## 2018-09-12 NOTE — ED ADULT NURSE NOTE - OBJECTIVE STATEMENT
55 y/o male presents to ED c/o lower back pain worsening over the past few days. Patient reports he was seen a few days ago, and had appointment at Dr. Baxter's clinic, but pain has worsened and Tylenol "has not helped him". Patient denies additional injury/trauma to area, falls/LOC.  Patient resting in bed, plan of care explained.

## 2018-09-12 NOTE — ED PROVIDER NOTE - PHYSICAL EXAMINATION
Physical Exam:   GEN: in no acute distress, AAOx3  RESP: CTAB, no respiratory distress  CV: normal rate, normal cap refill and perfusion  ABD: soft and NTND  EXT: No edema, No bony deformity of extremities  SKIN: No skin breaks, skin color normal for race  NEURO: CN grossly intact, No focal motor or sensory deficits with 5/5 strength in BLE. Normal reflexes of patella and achilles bilaterally.,    MSK: no midline TTP to C-T-L spine. Normal ROM of spine in all axes. Normal ROM of hips and knees, no bony TTP.   ~ Moris Pratt MD Physical Exam:   GEN: in no acute distress, AAOx3  RESP: CTAB, no respiratory distress  CV: normal rate, normal cap refill and perfusion  ABD: soft and NTND  EXT: No edema, No bony deformity of extremities  SKIN: No skin breaks, skin color normal for race  NEURO: CN grossly intact, No focal motor or sensory deficits with 5/5 strength in BLE. Normal reflexes of patella and achilles bilaterally.,    MSK: no midline TTP to C-T-L spine. Normal ROM of spine in all axes. Normal ROM of hips and knees, no bony TTP.   ~ Moris Pratt MD        Attending note. Patient is alert and in moderate distress. Examination of back reveals no rashes or lesions. Patient has tenderness in the left paralumbar region. Associated tenderness. Seated straight leg raise is negative. Sensation is intact and normal. DTRs are +2/4 equal and symmetrical. Patient has slight increase pain with rotation of the spine.

## 2018-09-12 NOTE — ED PROVIDER NOTE - ATTENDING CONTRIBUTION TO CARE
I performed a history and physical exam of the patient and discussed their management with the resident/ACP. I reviewed the resident/ACP's note and agree with the documented findings and plan of care.  attn  - see MDM

## 2018-09-12 NOTE — ED PROVIDER NOTE - PLAN OF CARE
1) Please follow-up with your Primary Medical Doctor in 3-5 days.  2) Return to the Emergency Department if you experiences: fevers, chills, numbness, weakness, issues with urinating or defecating, or symptoms that are new or recurrent.  3) Please call the [facility] at [phone]. 1) Please follow-up with your Primary Medical Doctor in 3-5 days.  2) Return to the Emergency Department if you experiences: fevers, chills, numbness, weakness, issues with urinating or defecating, or symptoms that are new or recurrent. Use the Lidoderm patch as prescribed.   3) Please call the [facility] at [phone].

## 2018-09-12 NOTE — ED PROVIDER NOTE - OBJECTIVE STATEMENT
Moris Pratt MD: 54-year-old male with history of hyperlipidemia who presents for a left > right sided lower back, that started 2 days ago after he was lifting a pot at work. Initially the pain was sudden onset pain nonradiating. Patient reports pain is worse with bending over. No numbness, weakness, fevers, chills, urinary or fecal incontinence or retention, or saddle anesthesia.  Translation by Dougie ROMAN in [place].   Previously seen in the ED on 8/4/2018  Other MRN: [id number] (wrong date of birth 1960) Moris Pratt MD: 54-year-old male with history of hyperlipidemia who presents for a left > right sided lower back, that started 2 days ago after he was lifting a pot at work. Initially the pain was sudden onset pain nonradiating. Patient reports pain is worse with bending over. No numbness, weakness, fevers, chills, urinary or fecal incontinence or retention, or saddle anesthesia.  Translation by Dougie ROMAN in [place].   Previously seen in the ED on 8/4/2018  Other MRN: [id number] (wrong date of birth 1960)         Attending note. Patient was seen in fast track #2. Agree with the above. Patient is complaining of constant left lower back pain for the last 8 days. Patient has been having pain intermittently for the last month. There is no radiation, fever, chills, bowel or bladder dysfunction, numbness or paresthesia.

## 2018-09-12 NOTE — ED PROVIDER NOTE - CARE PLAN
Principal Discharge DX:	Acute left-sided low back pain without sciatica  Assessment and plan of treatment:	1) Please follow-up with your Primary Medical Doctor in 3-5 days.  2) Return to the Emergency Department if you experiences: fevers, chills, numbness, weakness, issues with urinating or defecating, or symptoms that are new or recurrent.  3) Please call the [facility] at [phone]. Principal Discharge DX:	Acute left-sided low back pain without sciatica  Assessment and plan of treatment:	1) Please follow-up with your Primary Medical Doctor in 3-5 days.  2) Return to the Emergency Department if you experiences: fevers, chills, numbness, weakness, issues with urinating or defecating, or symptoms that are new or recurrent. Use the Lidoderm patch as prescribed.   3) Please call the [facility] at [phone].

## 2018-09-12 NOTE — ED PROVIDER NOTE - MEDICAL DECISION MAKING DETAILS
Moris Pratt MD: Atraumatic left greater than right lower back pain that started after lifting a heavy object at work. No red flags, no neurological deficits subjectively or objectively. No infectious symptoms. No trauma or blood thinners. Likely left paraspinal strain. We'll refer patient to Sp[facility] Moris Pratt MD: Atraumatic left greater than right lower back pain that started after lifting a heavy object at work. No red flags, no neurological deficits subjectively or objectively. No infectious symptoms. No trauma or blood thinners. Likely left paraspinal strain. We'll refer patient to Spine Center       Attending note-atraumatic left lower back pain. NSAIDs, ice, lidocaine patch, followup with spine Center for further evaluation.

## 2018-09-12 NOTE — ED ADULT NURSE NOTE - NSIMPLEMENTINTERV_GEN_ALL_ED
Implemented All Universal Safety Interventions:  [place] to call system. Call bell, personal items and telephone within reach. Instruct patient to call for assistance. Room bathroom lighting operational. Non-slip footwear when patient is off stretcher. Physically safe environment: no spills, clutter or unnecessary equipment. Stretcher in lowest position, wheels locked, appropriate side rails in place.

## 2018-09-12 NOTE — ED PROVIDER NOTE - NS ED ROS FT
CONST: no fevers, no chills  CV: no chest pain, no palpitations     RESP: no shortness of breath, no cough  ABD: no abdominal pain, no vomiting     : no dysuria, no hematuria   MSK: + back pain    NEURO: no headache, no focal weakness or loss of sensation     SKIN:  no rash, no lacerations.   ~ Moris Pratt MD

## 2018-09-26 ENCOUNTER — APPOINTMENT (OUTPATIENT)
Dept: INTERNAL MEDICINE | Facility: CLINIC | Age: 58
End: 2018-09-26

## 2018-09-26 ENCOUNTER — OUTPATIENT (OUTPATIENT)
Dept: OUTPATIENT SERVICES | Facility: HOSPITAL | Age: 58
LOS: 1 days | End: 2018-09-26
Payer: SELF-PAY

## 2018-09-26 VITALS
DIASTOLIC BLOOD PRESSURE: 70 MMHG | SYSTOLIC BLOOD PRESSURE: 108 MMHG | HEIGHT: 66 IN | BODY MASS INDEX: 27.32 KG/M2 | WEIGHT: 170 LBS

## 2018-09-26 DIAGNOSIS — H66.90 OTITIS MEDIA, UNSPECIFIED, UNSPECIFIED EAR: ICD-10-CM

## 2018-09-26 DIAGNOSIS — Z92.29 PERSONAL HISTORY OF OTHER DRUG THERAPY: ICD-10-CM

## 2018-09-26 DIAGNOSIS — M54.5 LOW BACK PAIN: ICD-10-CM

## 2018-09-26 DIAGNOSIS — Z87.09 PERSONAL HISTORY OF OTHER DISEASES OF THE RESPIRATORY SYSTEM: ICD-10-CM

## 2018-09-26 DIAGNOSIS — J06.9 ACUTE UPPER RESPIRATORY INFECTION, UNSPECIFIED: ICD-10-CM

## 2018-09-26 DIAGNOSIS — I10 ESSENTIAL (PRIMARY) HYPERTENSION: ICD-10-CM

## 2018-09-26 PROCEDURE — G0463: CPT

## 2018-09-26 RX ORDER — FLUTICASONE PROPIONATE 50 UG/1
50 SPRAY, METERED NASAL DAILY
Qty: 1 | Refills: 0 | Status: DISCONTINUED | COMMUNITY
Start: 2018-01-22 | End: 2018-09-26

## 2018-09-26 RX ORDER — DOCUSATE SODIUM 100 MG/1
100 CAPSULE ORAL 3 TIMES DAILY
Qty: 30 | Refills: 1 | Status: DISCONTINUED | COMMUNITY
Start: 2017-06-21 | End: 2018-09-26

## 2018-09-26 RX ORDER — POLYETHYLENE GLYCOL 3350 17 G/17G
17 POWDER, FOR SOLUTION ORAL DAILY
Qty: 30 | Refills: 1 | Status: DISCONTINUED | COMMUNITY
Start: 2017-06-21 | End: 2018-09-26

## 2018-09-26 RX ORDER — MOMETASONE FUROATE 1 MG/G
0.1 CREAM TOPICAL DAILY
Qty: 15 | Refills: 2 | Status: DISCONTINUED | COMMUNITY
Start: 2017-07-10 | End: 2018-09-26

## 2018-09-26 RX ORDER — HYDROCORTISONE 25 MG/G
2.5 CREAM TOPICAL
Qty: 30 | Refills: 0 | Status: DISCONTINUED | COMMUNITY
Start: 2017-05-28 | End: 2018-09-26

## 2018-09-26 RX ORDER — CLOTRIMAZOLE 10 MG/ML
1 SOLUTION TOPICAL TWICE DAILY
Qty: 1 | Refills: 0 | Status: DISCONTINUED | COMMUNITY
Start: 2017-06-21 | End: 2018-09-26

## 2018-09-26 NOTE — REVIEW OF SYSTEMS
[Back Pain] : back pain [Negative] : Genitourinary [Muscle Weakness] : no muscle weakness [Itching] : no itching [Skin Rash] : no skin rash [Dizziness] : no dizziness [Fainting] : no fainting [Anxiety] : no anxiety [Depression] : no depression

## 2018-09-26 NOTE — PLAN
[FreeTextEntry1] : 54 [place]de[place] speaking male hx seasonal allergies, seborrheic dermatosis, back pain presents for hospital follow up with back pain. \par \par #back pain- likely muscle spasm, no concerning symptoms, Will prescribe meloxicam 7.5 mg advised to take twice a day with food for at least 5 days and then as needed after. Will also give cyclobenzaprine to use as needed at night only. Advised not to drive or operate heavy machinery with that drug. Will give PT referral as well\par \par #plantar fasciitis- pt advised on stretches to do as well as better shoe support\par \par #HCM- flu shot today, needs cpe\par \par RTC n 5 weeks for follow up of pain and CPE\par \par Case d/w Dr. Guthrie.

## 2018-09-26 NOTE — PHYSICAL EXAM
[No Acute Distress] : no acute distress [Well Nourished] : well nourished [Well Developed] : well developed [Normal Sclera/Conjunctiva] : normal sclera/conjunctiva [PERRL] : pupils equal round and reactive to light [EOMI] : extraocular movements intact [Normal Outer Ear/Nose] : the outer ears and nose were normal in appearance [Normal Oropharynx] : the oropharynx was normal [No JVD] : no jugular venous distention [Supple] : supple [No Lymphadenopathy] : no lymphadenopathy [No Respiratory Distress] : no respiratory distress  [Clear to Auscultation] : lungs were clear to auscultation bilaterally [No Accessory Muscle Use] : no accessory muscle use [Normal Rate] : normal rate  [Regular Rhythm] : with a regular rhythm [Normal S1, S2] : normal S1 and S2 [Soft] : abdomen soft [Non Tender] : non-tender [Non-distended] : non-distended [No HSM] : no HSM [Normal Bowel Sounds] : normal bowel sounds [Normal Posterior Cervical Nodes] : no posterior cervical lymphadenopathy [Normal Anterior Cervical Nodes] : no anterior cervical lymphadenopathy [No Spinal Tenderness] : no spinal tenderness [No Joint Swelling] : no joint swelling [Grossly Normal Strength/Tone] : grossly normal strength/tone [No Rash] : no rash [Normal Gait] : normal gait [Coordination Grossly Intact] : coordination grossly intact [No Focal Deficits] : no focal deficits [Normal Affect] : the affect was normal [Normal Insight/Judgement] : insight and judgment were intact [de-identified] : L paraspinal tenderness, straight leg test negative [de-identified] : 5/5 strength both extremities, [de-identified] :  normal gait ,can walk on heels and toes, 2+ patellar reflex

## 2018-09-26 NOTE — HISTORY OF PRESENT ILLNESS
[FreeTextEntry1] : back pain [de-identified] : 54 Albanian speaking male hx seasonal allergies, seborrheic dermatosis, back pain presents for hospital follow up with back pain. \par \par #back pain- pt states has had back pain for 3 weeks. Started when lifting heavy pot at work. Went to ED on 9/12 and was discharged with ibuprofen, but he doesn't think helped. Thinks pain was initially getting better, but worsened again. Worse with bending over. Denies bowel/bladder incontinence, numbness/tingling, weakness, trauma, fever or chills. \par \par #heel pain- pt states last 6 months has had annoying heel pain on bottom of foot.

## 2018-09-28 DIAGNOSIS — M54.5 LOW BACK PAIN: ICD-10-CM

## 2018-11-07 ENCOUNTER — OUTPATIENT (OUTPATIENT)
Dept: OUTPATIENT SERVICES | Facility: HOSPITAL | Age: 58
LOS: 1 days | End: 2018-11-07
Payer: SELF-PAY

## 2018-11-07 ENCOUNTER — APPOINTMENT (OUTPATIENT)
Dept: INTERNAL MEDICINE | Facility: CLINIC | Age: 58
End: 2018-11-07

## 2018-11-07 VITALS
SYSTOLIC BLOOD PRESSURE: 110 MMHG | HEIGHT: 66 IN | WEIGHT: 172 LBS | DIASTOLIC BLOOD PRESSURE: 80 MMHG | BODY MASS INDEX: 27.64 KG/M2

## 2018-11-07 DIAGNOSIS — I10 ESSENTIAL (PRIMARY) HYPERTENSION: ICD-10-CM

## 2018-11-07 PROCEDURE — G0463: CPT

## 2018-11-07 RX ORDER — NAPROXEN 250 MG/1
250 TABLET ORAL EVERY 8 HOURS
Qty: 30 | Refills: 0 | Status: DISCONTINUED | COMMUNITY
Start: 2017-06-21 | End: 2018-11-07

## 2018-11-07 RX ORDER — MELOXICAM 15 MG/1
15 TABLET ORAL DAILY
Qty: 30 | Refills: 4 | Status: ACTIVE | COMMUNITY
Start: 2018-11-07 | End: 1900-01-01

## 2018-11-07 RX ORDER — CYCLOBENZAPRINE HYDROCHLORIDE 5 MG/1
5 TABLET, FILM COATED ORAL
Qty: 60 | Refills: 1 | Status: COMPLETED | COMMUNITY
Start: 2018-09-26 | End: 2018-10-15

## 2018-11-07 NOTE — PHYSICAL EXAM
[No Acute Distress] : no acute distress [Well Nourished] : well nourished [Well Developed] : well developed [No Respiratory Distress] : no respiratory distress  [Clear to Auscultation] : lungs were clear to auscultation bilaterally [Regular Rhythm] : with a regular rhythm [Normal S1, S2] : normal S1 and S2 [de-identified] : right lower extremity edema [de-identified] : Venous stasis changes in the right lower extremity. Onychosis  of the toe nails

## 2018-11-07 NOTE — ASSESSMENT
[FreeTextEntry1] : 54YOF with history of back pain and right sided plantar fascitis p/w worsening  right heel pain likely due to worsening plantar facisits.

## 2018-11-07 NOTE — PLAN
[FreeTextEntry1] : #Right plantar fascitis\par - referral for podiatry given\par - meloxicam 15 mg once a day with food given for anti-inflammatory effects\par - advised patient to stretch calf muscles in the morning\par - Xray of right foot sent due to recent trauma\par \par Case d/w Dr. Guthrie\par

## 2018-11-07 NOTE — HISTORY OF PRESENT ILLNESS
[FreeTextEntry8] : 54 YOM hx seasonal allergies, seborrheic dermatosis, back pain presents Patient endorses worsening right foot pain on the lateral aspect and heel with walking. He has not been taking any medications for it. It affects his walking as well. Unclear if patient has seen podiatry. Also endorses recent trauma to the foot.\par

## 2018-11-12 DIAGNOSIS — M72.2 PLANTAR FASCIAL FIBROMATOSIS: ICD-10-CM

## 2018-12-07 ENCOUNTER — APPOINTMENT (OUTPATIENT)
Dept: PODIATRY | Facility: HOSPITAL | Age: 58
End: 2018-12-07

## 2018-12-07 ENCOUNTER — OUTPATIENT (OUTPATIENT)
Dept: OUTPATIENT SERVICES | Facility: HOSPITAL | Age: 58
LOS: 1 days | End: 2018-12-07
Payer: SELF-PAY

## 2018-12-07 VITALS
WEIGHT: 172 LBS | HEART RATE: 70 BPM | HEIGHT: 66 IN | SYSTOLIC BLOOD PRESSURE: 116 MMHG | BODY MASS INDEX: 27.64 KG/M2 | RESPIRATION RATE: 16 BRPM | DIASTOLIC BLOOD PRESSURE: 70 MMHG

## 2018-12-07 DIAGNOSIS — M79.609 PAIN IN UNSPECIFIED LIMB: ICD-10-CM

## 2018-12-07 DIAGNOSIS — M79.674 PAIN IN RIGHT TOE(S): ICD-10-CM

## 2018-12-07 PROCEDURE — 73630 X-RAY EXAM OF FOOT: CPT

## 2018-12-07 PROCEDURE — G0463: CPT

## 2018-12-07 PROCEDURE — 73630 X-RAY EXAM OF FOOT: CPT | Mod: 26,RT

## 2018-12-10 DIAGNOSIS — M79.674 PAIN IN RIGHT TOE(S): ICD-10-CM

## 2018-12-10 DIAGNOSIS — M72.2 PLANTAR FASCIAL FIBROMATOSIS: ICD-10-CM

## 2018-12-14 ENCOUNTER — APPOINTMENT (OUTPATIENT)
Dept: PODIATRY | Facility: HOSPITAL | Age: 58
End: 2018-12-14

## 2018-12-21 ENCOUNTER — APPOINTMENT (OUTPATIENT)
Dept: PODIATRY | Facility: HOSPITAL | Age: 58
End: 2018-12-21

## 2018-12-21 ENCOUNTER — OUTPATIENT (OUTPATIENT)
Dept: OUTPATIENT SERVICES | Facility: HOSPITAL | Age: 58
LOS: 1 days | End: 2018-12-21
Payer: SELF-PAY

## 2018-12-21 VITALS
DIASTOLIC BLOOD PRESSURE: 72 MMHG | RESPIRATION RATE: 14 BRPM | WEIGHT: 172 LBS | SYSTOLIC BLOOD PRESSURE: 118 MMHG | BODY MASS INDEX: 27.64 KG/M2 | HEART RATE: 76 BPM | HEIGHT: 66 IN

## 2018-12-21 DIAGNOSIS — M79.609 PAIN IN UNSPECIFIED LIMB: ICD-10-CM

## 2018-12-21 DIAGNOSIS — G57.51 TARSAL TUNNEL SYNDROME, RIGHT LOWER LIMB: ICD-10-CM

## 2018-12-21 DIAGNOSIS — I83.90 ASYMPTOMATIC VARICOSE VEINS OF UNSPECIFIED LOWER EXTREMITY: ICD-10-CM

## 2018-12-21 DIAGNOSIS — M72.2 PLANTAR FASCIAL FIBROMATOSIS: ICD-10-CM

## 2018-12-21 PROCEDURE — G0463: CPT

## 2018-12-26 ENCOUNTER — APPOINTMENT (OUTPATIENT)
Dept: MRI IMAGING | Facility: CLINIC | Age: 58
End: 2018-12-26
Payer: COMMERCIAL

## 2018-12-26 ENCOUNTER — OUTPATIENT (OUTPATIENT)
Dept: OUTPATIENT SERVICES | Facility: HOSPITAL | Age: 58
LOS: 1 days | End: 2018-12-26
Payer: SELF-PAY

## 2018-12-26 DIAGNOSIS — I83.90 ASYMPTOMATIC VARICOSE VEINS OF UNSPECIFIED LOWER EXTREMITY: ICD-10-CM

## 2018-12-26 DIAGNOSIS — G57.51 TARSAL TUNNEL SYNDROME, RIGHT LOWER LIMB: ICD-10-CM

## 2018-12-26 DIAGNOSIS — M72.2 PLANTAR FASCIAL FIBROMATOSIS: ICD-10-CM

## 2018-12-26 PROCEDURE — 73721 MRI JNT OF LWR EXTRE W/O DYE: CPT

## 2018-12-26 PROCEDURE — 73721 MRI JNT OF LWR EXTRE W/O DYE: CPT | Mod: 26,RT

## 2018-12-26 NOTE — ED ADULT NURSE NOTE - NS PRO PASSIVE SMOKE EXP
Pulmonary Progress Note     Date of admission  12/20/2018    HISTORY OF PRESENT ILLNESS:  This is a 61-year-old  is a male who reports no   past medical history, smokes 3-5 cigarettes on a daily basis, presented to the   hospital on 12/20/2018 with complaints of 2 weeks of increasing dyspnea on   exertion, generalized fatigue, 4 weeks of increased lower extremity swelling   and abdominal distention.  Denies any fever, chills, sweats.  Denies any   active chest pain, no syncopal episodes, lightheadedness.  Denies any nausea,   vomiting, abdominal pain.  No diarrhea or dysuria.  Denies any recent travel.    Denies any history of clotting disorders.  Denies any stimulant utilization   or drug use. Patient was initially admitted to telemetry being worked up for   congestive heart failure given a BNP of greater than 1000 and hypoxia;   however, he continued to decline requiring BiPAP therapy and subsequently  transferred to the intensive care unit.     12/25/18: patient transferred out of ICU. Admits improvemnt in symptoms of   dyspnea and mild dependent edema. Denies productive cough or expectoration   no symptoms of reflux.  He denies any use of stimulants such as cocaine or   Methamphetamine. Occasionally uses marijuana. 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   Prior to that had been working as .    12/26/18: Denies any complaints today. Reports improvement in sob. Saturating in the 90s on 5lts. On diuretics. Net Is and Os -2300 over the past 24hrs. Plan for thoracentesis today.      Review of Systems  Review of Systems   Constitutional: Negative for chills, diaphoresis and fever.   HENT: Negative for ear pain, nosebleeds, sinus pain and tinnitus.    Eyes: Negative for blurred vision, double vision and photophobia.   Respiratory: Negative for hemoptysis and stridor. No dyspnea or cough  Cardiovascular: Positive for leg swelling. Negative for  chest pain, palpitations and orthopnea.   Gastrointestinal: Negative for abdominal pain, constipation, nausea and vomiting.   Genitourinary: Negative for dysuria, frequency, hematuria and urgency.   Musculoskeletal: Negative for back pain, myalgias and neck pain.   Skin: Negative for rash.   Neurological: Negative for sensory change, speech change, focal weakness, seizures and headaches.   Endo/Heme/Allergies: Does not bruise/bleed easily.   Psychiatric/Behavioral: Negative for depression, hallucinations, substance abuse and suicidal ideas.         Vital Signs    Temp: 36.9 °C (98 °F)  Pulse:  92  Resp:   16  BP: 135/89  SpO2: 90%     Physical Exam   Physical Exam   Constitutional: He is oriented to person, place, and time. He appears well-developed. No distress.   HENT:   Head: Normocephalic and atraumatic.   Right Ear: External ear normal.   Left Ear: External ear normal.   Nose: Nose normal.   Mouth/Throat: Oropharynx is clear and moist.   Eyes: Pupils are equal, round, and reactive to light. Conjunctivae are normal.  Neck: Normal range of motion. Neck supple. No JVD present. No tracheal deviation present.   Cardiovascular: Intact distal pulses.  Heart sounds normal No murmur heard.  Pulmonary/Chest: No stridor. No respiratory distress. He has no wheezes. Diminished breath   sounds at bases bailaterally  Abdominal: Soft. Bowel sounds are normal. He exhibits no distension. There is no tenderness.   There is no rebound.   Musculoskeletal: He exhibits 1+ edema. He exhibits no tenderness or deformity.   No clubbing or cyanosis   Neurological: He is alert and oriented to person, place, and time. No cranial nerve deficit.   Coordination normal. No focal weakness   Skin: Skin is warm and dry. No rash noted. He is not diaphoretic. No erythema.   Psychiatric: He has a normal mood and affect. His behavior is normal. Thought content normal.         Medications  Enoxaparin  Furosemide  Fish oil capsule  Hydrocortisone  suppository  helene-docusate     Fluids     Intake/Output Summary (Last 24 hours) at 12/25/18      Gross per 24 hour   Intake             2148 ml   Output             3250 ml   Net            -1102 ml         Laboratory        Recent Labs      12/21/18   0552  12/21/18   0759  12/21/18   1224   MARHE47T  7.28*  7.34*  7.31*   [id number]  68.3*  59.1*  60.3*   [id number]  70.7  69.0  68.4   [id number]  91.6*  92.8*  92.1*   ARTHCO3  31*  31*  30*   [id number]  5.0  5.0  5.0   ARTBE  2  4*  2       Results for DINA CROUCH (MRN [id number]) as of 12/25/2018 11:45   Ref. Range 12/25/2018 03:23   WBC Latest Ref Range: 4.8 - 10.8 K/uL 12.3 (H)   RBC Latest Ref Range: 4.70 - 6.10 M/uL 4.71   Hemoglobin Latest Ref Range: 14.0 - 18.0 g/dL 14.4   Hematocrit Latest Ref Range: 42.0 - 52.0 % 43.2   MCV Latest Ref Range: 81.4 - 97.8 fL 91.7   MCH Latest Ref Range: 27.0 - 33.0 pg 30.6   MCHC Latest Ref Range: 33.7 - 35.3 g/dL 33.3 (L)   RDW Latest Ref Range: 35.9 - 50.0 fL 42.8   Platelet Count Latest Ref Range: 164 - 446 K/uL 269   MPV Latest Ref Range: 9.0 - 12.9 fL 9.3   Sodium Latest Ref Range: 135 - 145 mmol/L 140   Potassium Latest Ref Range: 3.6 - 5.5 mmol/L 4.1   Chloride Latest Ref Range: 96 - 112 mmol/L 100   Co2 Latest Ref Range: 20 - 33 mmol/L 40 (H)   Anion Gap Latest Ref Range: 0.0 - 11.9  0.0   Glucose Latest Ref Range: 65 - 99 mg/dL 116 (H)   Bun Latest Ref Range: 8 - 22 mg/dL 20   Creatinine Latest Ref Range: 0.50 - 1.40 mg/dL 0.90   GFR If  Latest Ref Range: >60 mL/min/1.73 m 2 >60   GFR If Non  Latest Ref Range: >60 mL/min/1.73 m 2 >60   Calcium Latest Ref Range: 8.5 - 10.5 mg/dL 9.1   Magnesium Latest Ref Range: 1.5 - 2.5 mg/dL 2.1     Imaging  X-Ray:  I have personally reviewed the images and compared with prior images. and My impression is: Improved edema     Assessment/Plan    #Hypoxic hypercapnic respiratory failure  #Severe pulmonary HTN  #Bilateral pleural effusions    - Denies  sob today. Saturating in the 90s on 5lts  - Taper O2 for spo2>90%  - Continue diuresis for severe pulmonary hypertension  - Scheduled for thoracentesis today  - CTA showed no PE- no indication for anticoagulation, but showed emphysema and ILD  - Patient will need right heart cath, complete LFTs as well as further workup of ILD once cardiopulmonary status improves.  - We will continue to follow     No

## 2019-01-04 ENCOUNTER — APPOINTMENT (OUTPATIENT)
Dept: PODIATRY | Facility: HOSPITAL | Age: 59
End: 2019-01-04

## 2019-01-04 ENCOUNTER — OUTPATIENT (OUTPATIENT)
Dept: OUTPATIENT SERVICES | Facility: HOSPITAL | Age: 59
LOS: 1 days | End: 2019-01-04
Payer: SELF-PAY

## 2019-01-04 VITALS
DIASTOLIC BLOOD PRESSURE: 70 MMHG | SYSTOLIC BLOOD PRESSURE: 107 MMHG | HEIGHT: 66 IN | WEIGHT: 172 LBS | HEART RATE: 80 BPM | BODY MASS INDEX: 27.64 KG/M2 | RESPIRATION RATE: 16 BRPM

## 2019-01-04 DIAGNOSIS — M79.609 PAIN IN UNSPECIFIED LIMB: ICD-10-CM

## 2019-01-04 DIAGNOSIS — M62.9 DISORDER OF MUSCLE, UNSPECIFIED: ICD-10-CM

## 2019-01-04 DIAGNOSIS — M72.2 PLANTAR FASCIAL FIBROMATOSIS: ICD-10-CM

## 2019-01-04 PROCEDURE — G0463: CPT

## 2019-01-10 ENCOUNTER — EMERGENCY (EMERGENCY)
Facility: HOSPITAL | Age: 59
LOS: 1 days | Discharge: HOME | End: 2019-01-10
Attending: EMERGENCY MEDICINE
Payer: MEDICAID

## 2019-01-10 VITALS
TEMPERATURE: 99 F | SYSTOLIC BLOOD PRESSURE: 145 MMHG | OXYGEN SATURATION: 99 % | DIASTOLIC BLOOD PRESSURE: 78 MMHG | HEART RATE: 77 BPM | RESPIRATION RATE: 17 BRPM

## 2019-01-10 VITALS
DIASTOLIC BLOOD PRESSURE: 80 MMHG | RESPIRATION RATE: 18 BRPM | HEART RATE: 64 BPM | SYSTOLIC BLOOD PRESSURE: 147 MMHG | OXYGEN SATURATION: 98 % | TEMPERATURE: 98 F

## 2019-01-10 PROCEDURE — 69209 REMOVE IMPACTED EAR WAX UNI: CPT

## 2019-01-10 PROCEDURE — 99282 EMERGENCY DEPT VISIT SF MDM: CPT | Mod: 25

## 2019-01-10 NOTE — ED PROVIDER NOTE - OBJECTIVE STATEMENT
Attending note. Patient was seen in fast track room #1. Patient is complaining of clot left ear approximately one week with decreased hearing. He denies any pain or fever. He has no sore throat.

## 2019-01-10 NOTE — ED ADULT NURSE NOTE - OBJECTIVE STATEMENT
59y/o male presented to the ED from home with complaint of left  ear pain. A&Ox3, ambulatory. Primary Sinhala speaking. Patient repots left ear pain x1 week. Patient states it feels like "I have a clot in my ear." associated with decreased hearing in left ear, Denies fever, chills, N/V/D, ear discharge.

## 2019-01-10 NOTE — ED ADULT NURSE NOTE - NSIMPLEMENTINTERV_GEN_ALL_ED
Implemented All Universal Safety Interventions:  [place] to call system. Call bell, personal items and telephone within reach. Instruct patient to call for assistance. Room bathroom lighting operational. Non-slip footwear when patient is off stretcher. Physically safe environment: no spills, clutter or unnecessary equipment. Stretcher in lowest position, wheels locked, appropriate side rails in place.

## 2019-01-10 NOTE — ED PROVIDER NOTE - PHYSICAL EXAMINATION
Attending note-patient is alert and in no acute distress. Examination of the left ear reveals cerumen impaction left ear canal. Ear is nontender. There is no adenopathy. Oropharynx is normal. Tympanic membrane examined after irrigation is normal.

## 2019-01-25 ENCOUNTER — APPOINTMENT (OUTPATIENT)
Dept: PODIATRY | Facility: HOSPITAL | Age: 59
End: 2019-01-25

## 2019-03-13 ENCOUNTER — APPOINTMENT (OUTPATIENT)
Dept: PODIATRY | Facility: HOSPITAL | Age: 59
End: 2019-03-13

## 2019-10-08 NOTE — ED ADULT NURSE NOTE - FINAL NURSING ELECTRONIC SIGNATURE
Group Topic:  Behavioral Activation Group    Date: 10/8/2019  Start Time: 10:00 AM  End Time: 11:00 AM    Focus: Goals  Number in attendance: 10    Licensed Provider Directing Treatment: Marce Raya LPC    Documentation Completed By (Under Supervision of Licensed Provider): HERBER Laws Student Intern     I was present and agree with the content of the note.    Marce Raya LPC    Attendance: Present  Patient Response: Appropriate feedback     Pt’s goals are to go to cousins, get lunch, and call her brother. Pt reported she also wants to focus on filling out applications, go on walk and play card/dominos. Pt stated she would also like to avoid using, isolating, and negative thinking. Pt expressed she would like to practice mindfulness, meditation, and gratitude.   
Group Topic:  Monitoring Safety and Relapse    Date: 10/8/2019  Start Time:  9:00 AM  End Time: 12:00 PM    Number in attendance: 10    Licensed Provider Directing Treatment: Marce Raya LPC    Documentation Completed By (Under Supervision of Licensed Provider): EVL Corrales Student    I was present and agree with the content of the note.   Marce Raya LPC    Types of Safety Concerns: None  Physical Concerns: None  Use of Street Drugs: No - Pt reported drinking \"two beers\".  Taking Medication as Prescribed?: Yes    
Group Topic: BH Coping Skills Education    Date: 10/8/2019  Start Time:  9:00 AM  End Time: 10:00 AM    Focus: Ending Relationships  Number in attendance: 10    A presentation was given on strategies tolerate an emotional distressing moment by finding new relationships as opposed to isolating and feeling lonely, and ending unhealthy destructive relationships.     Licensed Provider Directing Treatment: Marce Raya LPC    Documentation Completed By (Under Supervision of Licensed Provider): VEL Corrales Student    I was present and agree with the content of the note.   Marce Raya LPC    Attendance: Present  Patient Response: Attentive, Good eye contact and Quiet    Pt was attentive an provided non verbal feedback.   
Group Topic: BH Process Group     Date: 10/8/2019  Start Time: 11:00 AM  End Time: 12:00 PM    Focus: Homework review/Check-in  Number in attendance: 10      Attendance: Present  Patient Response: Appropriate feedback, Attentive and Interactive  Mood/Affect: Lethargic and appropriate  Behavior/Socialization: Appropriate to group  Thought Process: Appropriate     Pt reported completing most of her set goals from yesterday. Pt shared she ate lunch with her dad and spontaneously decided to take a walk. Pt stated during her walk she was able to utilize mindfulness, opposite action and thought challenging to help reduce anxiety, and expressed she felt really good while walking. Pt described her mood as “so so” today and stated her biggest struggle currently is being bored. Pt reported she is actively looking for a job and will be meeting with her  tomorrow to work on this more specifically. Pt talked about fear associated with being able to handle a job going forward; group offered insight and support.    Marce Raya, LPC    
13-Feb-2018 04:25

## 2020-01-04 ENCOUNTER — EMERGENCY (EMERGENCY)
Facility: HOSPITAL | Age: 60
LOS: 1 days | End: 2020-01-04
Attending: EMERGENCY MEDICINE
Payer: MEDICAID

## 2020-01-04 VITALS
TEMPERATURE: 99 F | HEART RATE: 69 BPM | RESPIRATION RATE: 17 BRPM | DIASTOLIC BLOOD PRESSURE: 74 MMHG | OXYGEN SATURATION: 100 % | SYSTOLIC BLOOD PRESSURE: 117 MMHG

## 2020-01-04 VITALS
HEART RATE: 65 BPM | RESPIRATION RATE: 17 BRPM | SYSTOLIC BLOOD PRESSURE: 126 MMHG | WEIGHT: 160.06 LBS | DIASTOLIC BLOOD PRESSURE: 84 MMHG | HEIGHT: 70 IN | TEMPERATURE: 98 F | OXYGEN SATURATION: 97 %

## 2020-01-04 LAB
ALBUMIN SERPL ELPH-MCNC: 4.4 G/DL — SIGNIFICANT CHANGE UP (ref 3.3–5)
ALP SERPL-CCNC: 63 U/L — SIGNIFICANT CHANGE UP (ref 40–120)
ALT FLD-CCNC: 21 U/L — SIGNIFICANT CHANGE UP (ref 10–45)
ANION GAP SERPL CALC-SCNC: 10 MMOL/L — SIGNIFICANT CHANGE UP (ref 5–17)
APPEARANCE UR: CLEAR — SIGNIFICANT CHANGE UP
AST SERPL-CCNC: 17 U/L — SIGNIFICANT CHANGE UP (ref 10–40)
BILIRUB SERPL-MCNC: 0.3 MG/DL — SIGNIFICANT CHANGE UP (ref 0.2–1.2)
BILIRUB UR-MCNC: NEGATIVE — SIGNIFICANT CHANGE UP
BUN SERPL-MCNC: 19 MG/DL — SIGNIFICANT CHANGE UP (ref 7–23)
CALCIUM SERPL-MCNC: 9.9 MG/DL — SIGNIFICANT CHANGE UP (ref 8.4–10.5)
CHLORIDE SERPL-SCNC: 105 MMOL/L — SIGNIFICANT CHANGE UP (ref 96–108)
CO2 SERPL-SCNC: 24 MMOL/L — SIGNIFICANT CHANGE UP (ref 22–31)
COLOR SPEC: SIGNIFICANT CHANGE UP
CREAT SERPL-MCNC: 0.91 MG/DL — SIGNIFICANT CHANGE UP (ref 0.5–1.3)
DIFF PNL FLD: NEGATIVE — SIGNIFICANT CHANGE UP
GLUCOSE SERPL-MCNC: 105 MG/DL — HIGH (ref 70–99)
GLUCOSE UR QL: NEGATIVE — SIGNIFICANT CHANGE UP
HCT VFR BLD CALC: 47.4 % — SIGNIFICANT CHANGE UP (ref 39–50)
HGB BLD-MCNC: 15.6 G/DL — SIGNIFICANT CHANGE UP (ref 13–17)
HIV 1 & 2 AB SERPL IA.RAPID: SIGNIFICANT CHANGE UP
KETONES UR-MCNC: NEGATIVE — SIGNIFICANT CHANGE UP
LEUKOCYTE ESTERASE UR-ACNC: NEGATIVE — SIGNIFICANT CHANGE UP
MCHC RBC-ENTMCNC: 28.3 PG — SIGNIFICANT CHANGE UP (ref 27–34)
MCHC RBC-ENTMCNC: 32.9 GM/DL — SIGNIFICANT CHANGE UP (ref 32–36)
MCV RBC AUTO: 85.9 FL — SIGNIFICANT CHANGE UP (ref 80–100)
NITRITE UR-MCNC: NEGATIVE — SIGNIFICANT CHANGE UP
NRBC # BLD: 0 /100 WBCS — SIGNIFICANT CHANGE UP (ref 0–0)
PH UR: 5.5 — SIGNIFICANT CHANGE UP (ref 5–8)
PLATELET # BLD AUTO: 162 K/UL — SIGNIFICANT CHANGE UP (ref 150–400)
POTASSIUM SERPL-MCNC: 4 MMOL/L — SIGNIFICANT CHANGE UP (ref 3.5–5.3)
POTASSIUM SERPL-SCNC: 4 MMOL/L — SIGNIFICANT CHANGE UP (ref 3.5–5.3)
PROT SERPL-MCNC: 7.2 G/DL — SIGNIFICANT CHANGE UP (ref 6–8.3)
PROT UR-MCNC: NEGATIVE — SIGNIFICANT CHANGE UP
RBC # BLD: 5.52 M/UL — SIGNIFICANT CHANGE UP (ref 4.2–5.8)
RBC # FLD: 12.1 % — SIGNIFICANT CHANGE UP (ref 10.3–14.5)
SODIUM SERPL-SCNC: 139 MMOL/L — SIGNIFICANT CHANGE UP (ref 135–145)
SP GR SPEC: 1.01 — SIGNIFICANT CHANGE UP (ref 1.01–1.02)
T PALLIDUM AB TITR SER: NEGATIVE — SIGNIFICANT CHANGE UP
UROBILINOGEN FLD QL: NEGATIVE — SIGNIFICANT CHANGE UP
WBC # BLD: 6.14 K/UL — SIGNIFICANT CHANGE UP (ref 3.8–10.5)
WBC # FLD AUTO: 6.14 K/UL — SIGNIFICANT CHANGE UP (ref 3.8–10.5)

## 2020-01-04 PROCEDURE — 80053 COMPREHEN METABOLIC PANEL: CPT

## 2020-01-04 PROCEDURE — 87591 N.GONORRHOEAE DNA AMP PROB: CPT

## 2020-01-04 PROCEDURE — 86703 HIV-1/HIV-2 1 RESULT ANTBDY: CPT

## 2020-01-04 PROCEDURE — 81003 URINALYSIS AUTO W/O SCOPE: CPT

## 2020-01-04 PROCEDURE — 87491 CHLMYD TRACH DNA AMP PROBE: CPT

## 2020-01-04 PROCEDURE — 85027 COMPLETE CBC AUTOMATED: CPT

## 2020-01-04 PROCEDURE — 87086 URINE CULTURE/COLONY COUNT: CPT

## 2020-01-04 PROCEDURE — 86780 TREPONEMA PALLIDUM: CPT

## 2020-01-04 PROCEDURE — 74177 CT ABD & PELVIS W/CONTRAST: CPT | Mod: 26

## 2020-01-04 PROCEDURE — 99284 EMERGENCY DEPT VISIT MOD MDM: CPT

## 2020-01-04 PROCEDURE — 74177 CT ABD & PELVIS W/CONTRAST: CPT

## 2020-01-04 PROCEDURE — 99284 EMERGENCY DEPT VISIT MOD MDM: CPT | Mod: 25

## 2020-01-04 RX ORDER — LIDOCAINE 4 G/100G
1 CREAM TOPICAL ONCE
Refills: 0 | Status: COMPLETED | OUTPATIENT
Start: 2020-01-04 | End: 2020-01-04

## 2020-01-04 RX ADMIN — LIDOCAINE 1 APPLICATION(S): 4 CREAM TOPICAL at 09:10

## 2020-01-04 NOTE — ED PROVIDER NOTE - PROGRESS NOTE DETAILS
#[id number]  Patient reassessed, NAD, non-toxic appearing. results dw pt, questions answered. reports sx relief. carol Zarate D.O. PGY2

## 2020-01-04 NOTE — ED PROVIDER NOTE - OBJECTIVE STATEMENT
#[id number]    56 yo m pmh hld, pw rectal pain. pt reports three week hx of rectal pain, non radiating, 7/10, sharp, worse w/ BMs. given Mupirocin and Keflex w/o relief of sx by outpatient pcp. denies hx of rectal insertions, denies hx of STDs. denies cp, sob, n/v, f/c, gu sx.

## 2020-01-04 NOTE — ED PROVIDER NOTE - CLINICAL SUMMARY MEDICAL DECISION MAKING FREE TEXT BOX
54 yo m pw rectal pain x 3 weeks. no abnormalities noted on exam. will send STD labs. treat sx. reassess.

## 2020-01-04 NOTE — ED PROVIDER NOTE - NS ED ROS FT
GENERAL: No fever or chills, //             EYES: no change in vision, //             HEENT: no trouble swallowing or speaking, //             CARDIAC: no chest pain, //              PULMONARY: no cough or SOB, //             GI: rectal pain, //             : No changes in urination,  //            SKIN: no rashes,  //            NEURO: no headache,  //             MSK: No joint pain otherwise as HPI or negative. ~Nish Zarate DO PGY2

## 2020-01-04 NOTE — ED PROVIDER NOTE - ATTENDING CONTRIBUTION TO CARE
clinic   [id number] Ross[place]  55y hisp male pmh Eczema, Seborrheic Keratosis,Osteo Knee, HLD not on statins. Pt comes to ed co Pain in rectum for past 3weeks. Pt was seen by pmd and told had an infection and treated with keflex and mupirocin without relief. No fever chills. No pain with bm, but stool is yellow. no bleeding. No nvdc, cp,sob,palps,cough,change in weight,anorexia.Heterosexual.NO travel surg, habits, PE WDWN male nad ncat neck supple chest clear ap abd soft +bs no mass guarding, Cv no rmg, neuro no focal defects  Fito Kate MD, Facep

## 2020-01-04 NOTE — ED PROVIDER NOTE - PATIENT PORTAL LINK FT
You can access the FollowMyHealth Patient Portal offered by [facility] by registering at the following website: [email or web address]. By joining [facility]’s FollowMyHealth portal, you will also be able to view your health information using other applications (apps) compatible with our system.

## 2020-01-04 NOTE — ED ADULT NURSE NOTE - OBJECTIVE STATEMENT
56 y/o male Georgian speaking,  provided by Dr. Zarate.    #[id number].  Pt c/o rectal pain   pt reports three week hx of rectal pain, non radiating, 7/10, sharp, worse w/ BMs. given Mupirocin and Keflex w/o relief of sx by outpatient pcp. denies hx of rectal insertions, denies hx of STDs. denies cp, sob, n/v, f/c, gu sx.rectal pain. pt reports three week hx of rectal pain, non radiating, 7/10, sharp, worse w/ BMs. given Mupirocin and Keflex w/o relief of sx by outpatient pcp. denies hx of rectal insertions, denies hx of STDs. denies cp, sob, n/v, f/c, gu sx. 56 y/o male Pashto speaking,  provided by Dr. Zarate.   #[id number].  Pt c/o sharp non radiating rectal pain x 3 weeks, worse with BM's.  Pt was given Mupirocin and Keflex  by his doctor but no relief of symptoms.  Pt denies history of STD's, rectal insertion of objects.  Pt denies n/v/d, fever, chills, chest pain, SOB.  No acute respiratory distress noted.  Pt examined by Dr. Zarate.

## 2020-01-04 NOTE — ED PROVIDER NOTE - PHYSICAL EXAMINATION
General: well appearing, interactive, well nourished, no apparent distress, ncat  HEENT: EOMI, PERRLA, normal mucosa, normal oropharynx, no lesions on the lips or on oral mucosa, normal external ear  Neck: supple, no lymphadenopathy, full range of motion, no nuchal rigidity  CV: RRR, normal S1 and S2 with no murmur, capillary refill less than two seconds  Resp: lungs CTA b/l, good aeration bilaterally, symmetric chest wall   Abd: non-distended, soft, non-tender  : no CVA tenderness, Testicular exam with normal lie and CMR bilaterally, no significant erythema, tenderness or swelling appreciated. No appreciable inguinal hernia bilaterally. No rashes or lesions. No penile discharge. No blood at meatus.   rectal: no external hemorrhoids noted, no internal hemorrhoids palpated, no fluctuance noted, no masses noted  MSK: full range of motion, no cyanosis, no edema, no clubbing, no immobility  Neuro: CN II-XII grossly intact, muscle strength 5/5 in all extremities, normal gait  Skin: no rashes, skin intact     (chaperone- rozina, rn)

## 2020-01-04 NOTE — ED PROVIDER NOTE - NSFOLLOWUPCLINICS_GEN_ALL_ED_FT
[facility]  Gastroenterology  [address] [place] [id number]  Phone: [phone]  Fax:   Follow Up Time:

## 2020-01-04 NOTE — ED PROVIDER NOTE - NSFOLLOWUPINSTRUCTIONS_ED_ALL_ED_FT
1) Please follow up with your Primary Care Provider in 24-48 hours  2) Seek immediate medical care for any new or returning symptoms including but not limited severe pain, high fevers  3) Use Preparation H has directed on the packaging  4) Use Sitz Baths

## 2020-01-05 LAB
CULTURE RESULTS: NO GROWTH — SIGNIFICANT CHANGE UP
SPECIMEN SOURCE: SIGNIFICANT CHANGE UP

## 2020-01-06 LAB
C TRACH RRNA SPEC QL NAA+PROBE: SIGNIFICANT CHANGE UP
N GONORRHOEA RRNA SPEC QL NAA+PROBE: SIGNIFICANT CHANGE UP
SPECIMEN SOURCE: SIGNIFICANT CHANGE UP

## 2020-01-27 ENCOUNTER — APPOINTMENT (OUTPATIENT)
Dept: INTERNAL MEDICINE | Facility: CLINIC | Age: 60
End: 2020-01-27

## 2020-01-27 ENCOUNTER — OUTPATIENT (OUTPATIENT)
Dept: OUTPATIENT SERVICES | Facility: HOSPITAL | Age: 60
LOS: 1 days | End: 2020-01-27
Payer: SELF-PAY

## 2020-01-27 VITALS
WEIGHT: 176 LBS | HEIGHT: 66 IN | DIASTOLIC BLOOD PRESSURE: 80 MMHG | SYSTOLIC BLOOD PRESSURE: 110 MMHG | BODY MASS INDEX: 28.28 KG/M2

## 2020-01-27 DIAGNOSIS — K62.89 OTHER SPECIFIED DISEASES OF ANUS AND RECTUM: ICD-10-CM

## 2020-01-27 DIAGNOSIS — Z00.00 ENCOUNTER FOR GENERAL ADULT MEDICAL EXAMINATION W/OUT ABNORMAL FINDINGS: ICD-10-CM

## 2020-01-27 PROCEDURE — 90688 IIV4 VACCINE SPLT 0.5 ML IM: CPT

## 2020-01-27 PROCEDURE — G0008: CPT

## 2020-01-27 PROCEDURE — G0463: CPT | Mod: 25

## 2020-01-28 DIAGNOSIS — I10 ESSENTIAL (PRIMARY) HYPERTENSION: ICD-10-CM

## 2020-01-31 DIAGNOSIS — Z23 ENCOUNTER FOR IMMUNIZATION: ICD-10-CM

## 2020-01-31 DIAGNOSIS — K62.89 OTHER SPECIFIED DISEASES OF ANUS AND RECTUM: ICD-10-CM

## 2020-01-31 NOTE — PHYSICAL EXAM
[Normal] : normal rate, regular rhythm, normal S1 and S2 and no murmur heard [Soft] : abdomen soft [Non-distended] : non-distended [No Masses] : no abdominal mass palpated [No HSM] : no HSM [Normal Bowel Sounds] : normal bowel sounds [No Hernias] : no hernias [Normal Sphincter Tone] : normal sphincter tone [No Mass] : no mass [de-identified] : diffusely mildly tender to palpation without guarding [FreeTextEntry1] : No blood or discharge in or perianal. No structural perianal irregularities. normal sphincter tone, no masses or fluctuance on digital exam. [de-identified] : Hyperpigmentated ecchymotic R knee, chronic.

## 2020-01-31 NOTE — END OF VISIT
[] : Resident [FreeTextEntry3] : anal pain much improved with no abnl on exam, will just f/up for colonoscopy

## 2020-01-31 NOTE — ASSESSMENT
[FreeTextEntry1] : 56 yo M with PMH seborrheic dermatosis, back pain, presents with 3 months of anal pain. Unremarkable PE. Differential includes abscess, fistula, irritated mucosa from diarrhea.\par \par #Anal pain\par -GI referral\par -colonoscopy\par \par #HCM\par -flu shot

## 2020-01-31 NOTE — REVIEW OF SYSTEMS
[Abdominal Pain] : abdominal pain [Skin Rash] : skin rash [Fever] : no fever [Sore Throat] : no sore throat [Palpitations] : no palpitations [Constipation] : no constipation [Dysuria] : no dysuria [de-identified] : Endorses rash in R knee for 14 years, hyperpigmented
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
## 2022-03-01 NOTE — ED ADULT NURSE NOTE - CINV DISCH TEACH PARTICIP
Retention Suture Text: Retention sutures were placed to support the closure and prevent dehiscence. Patient

## 2022-06-10 NOTE — ED PROVIDER NOTE - NS ED ATTENDING STATEMENT MOD
I have personally seen and examined this patient. I have fully participated in the care of this patient. I have reviewed all pertinent clinical information, including history physical exam, plan and the Resident's note and agree except as noted n/a I have personally performed a face to face diagnostic evaluation on this patient. I have reviewed the PA note and agree with the history, exam, and plan of care, except as noted.

## 2022-09-09 NOTE — ED ADULT NURSE NOTE - ED STAT RN HANDOFF WHERE
For information on Fall & Injury Prevention, visit: [email or web address] OR  [email or web address] OR  https://www.cdc.gov/steadi/patient.html Fast Track

## 2022-09-16 NOTE — ED PROVIDER NOTE - NS ED MD DISPO DISCHARGE CCDA
[No Acute Distress] : no acute distress [Normal Appearance] : normal appearance [Normal Rate/Rhythm] : normal rate/rhythm [Normal S1, S2] : normal s1, s2 [No Murmurs] : no murmurs [No Rubs] : no rubs [No Gallops] : no gallops [No Resp Distress] : no resp distress [No Acc Muscle Use] : no acc muscle use [Normal Rhythm and Effort] : normal rhythm and effort [Clear to Auscultation Bilaterally] : clear to auscultation bilaterally [Normal to Percussion] : normal to percussion [No Abnormalities] : no abnormalities [Normal Gait] : normal gait [No Clubbing] : no clubbing [No Cyanosis] : no cyanosis [No Edema] : no edema [FROM] : FROM [Normal Color/ Pigmentation] : normal color/ pigmentation [No Focal Deficits] : no focal deficits [Oriented x3] : oriented x3 [Normal Affect] : normal affect Patient/Caregiver provided printed discharge information.

## 2023-12-18 NOTE — ED ADULT NURSE NOTE - CAS DISCH TRANSFER METHOD
[facility] is located on the 2nd floor of this building. You can call them for an appointment by calling [phone]. Tell them you have had blood in your urine and UTI symptoms, but negative urine cultures.     Take two of your Valtrex (1,000 mg) daily for the next 5 days.   Take the pain medicine for the dysuria up to three times a day for the next two days to help with pain.      Private car

## 2024-02-08 NOTE — ED ADULT TRIAGE NOTE - MODE OF ARRIVAL
Private Vehicle
Quality [facility]: Preventive Care And Screening: Tobacco Use: Screening And Cessation Intervention: Patient screened for tobacco use and is an ex/non-smoker
Detail Level: Detailed
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
